# Patient Record
Sex: FEMALE | Race: WHITE | Employment: OTHER | URBAN - METROPOLITAN AREA
[De-identification: names, ages, dates, MRNs, and addresses within clinical notes are randomized per-mention and may not be internally consistent; named-entity substitution may affect disease eponyms.]

---

## 2020-06-23 ENCOUNTER — OFFICE VISIT (OUTPATIENT)
Dept: PRIMARY CARE CLINIC | Age: 70
End: 2020-06-23

## 2020-06-23 PROCEDURE — 99213 OFFICE O/P EST LOW 20 MIN: CPT | Performed by: NURSE PRACTITIONER

## 2020-06-23 NOTE — PROGRESS NOTES
order in place, patient verbalized understanding. Preventing the spread of Coronavirus, Possible COVID-19 exposure with self-quarantine, isolation instructions and management of symptoms, and to follow-up with primary care physician or emergency department if symptomatic complaints worsen.

## 2020-06-25 LAB
SARS-COV-2: NOT DETECTED
SOURCE: NORMAL

## 2021-01-12 ENCOUNTER — NURSE TRIAGE (OUTPATIENT)
Dept: OTHER | Facility: CLINIC | Age: 71
End: 2021-01-12

## 2021-01-12 ENCOUNTER — TELEPHONE (OUTPATIENT)
Dept: ADMINISTRATIVE | Age: 71
End: 2021-01-12

## 2021-01-12 NOTE — TELEPHONE ENCOUNTER
Patient called pre-service center [facility]) Jacqueline Inocencio in [place]  with red flag complaint. No PCP at this time. Brief description of triage: The patient is having blood pressure issues. The patient is monitoring her blood pressure at home- most often 170-200/80. While on the phone with this writer the blood pressure was 240/93. Triage indicates for patient to to be seen today. UCC or the ED suggested for immediate care. Reiteration of the recommendation and patient is refusing the ED or the UCC.but then asks where an UCC is near her. [facility] found for her. Care advice provided, patient verbalizes understanding; denies any other questions or concerns; instructed to call back for any new or worsening symptoms. Writer provided warm transfer to [facility] for appointment scheduling. Attention Provider: Thank you for allowing me to participate in the care of your patient. The patient was connected to triage in response to information provided to the ECC. Please do not respond through this encounter as the response is not directed to a shared pool. Reason for Disposition   Systolic BP >= 330 OR Diastolic >= 993    Answer Assessment - Initial Assessment Questions  1. BLOOD PRESSURE: \"What is the blood pressure? \" \"Did you take at least two measurements 5 minutes apart?\"      190/80  Taken while on the phone with this writer= 240/93    2. ONSET: \"When did you take your blood pressure? \"      Taken while on the phone with me    3. HOW: \"How did you obtain the blood pressure? \" (e.g., visiting nurse, automatic home BP monitor)        Automatic home blood pressure cuff on her wrist    4. HISTORY: \"Do you have a history of high blood pressure? \"      Does have a history of HTN but has not been medicated for this for one year- she is interested in taking only one medication for her blood pressure not multiple medications.  Most recently (one year ago) was on Lisinopril. 5. MEDICATIONS: [place] you taking any medications for blood pressure? \" \"Have you missed any doses recently? \"      Has been diagnosed several years ago with HTN. Has not taken blood pressure medication for one year. 6. OTHER SYMPTOMS: \"Do you have any symptoms? \" (e.g., headache, chest pain, blurred vision, difficulty breathing, weakness)      The patient denies a headache, chest pain, blurred vision, SOB, or weakness. She states she is fatigued. 7. PREGNANCY: \"Is there any chance you are pregnant? \" \"When was your last menstrual period? \"      n/a    Protocols used: HIGH BLOOD PRESSURE-ADULT-[place]  see above documentation

## 2021-03-23 ENCOUNTER — OFFICE VISIT (OUTPATIENT)
Dept: PRIMARY CARE CLINIC | Age: 71
End: 2021-03-23
Payer: MEDICARE

## 2021-03-23 VITALS
BODY MASS INDEX: 23.75 KG/M2 | TEMPERATURE: 97.9 F | HEART RATE: 63 BPM | SYSTOLIC BLOOD PRESSURE: 228 MMHG | RESPIRATION RATE: 16 BRPM | DIASTOLIC BLOOD PRESSURE: 86 MMHG | HEIGHT: 60 IN | WEIGHT: 121 LBS

## 2021-03-23 DIAGNOSIS — R01.1 SYSTOLIC MURMUR: ICD-10-CM

## 2021-03-23 DIAGNOSIS — R73.03 PREDIABETES: ICD-10-CM

## 2021-03-23 DIAGNOSIS — Z86.73 H/O TIA (TRANSIENT ISCHEMIC ATTACK) AND STROKE: ICD-10-CM

## 2021-03-23 DIAGNOSIS — I10 ESSENTIAL HYPERTENSION: ICD-10-CM

## 2021-03-23 DIAGNOSIS — Z76.89 ENCOUNTER TO ESTABLISH CARE: Primary | ICD-10-CM

## 2021-03-23 DIAGNOSIS — E78.2 MIXED HYPERLIPIDEMIA: ICD-10-CM

## 2021-03-23 DIAGNOSIS — Z12.11 COLON CANCER SCREENING: ICD-10-CM

## 2021-03-23 DIAGNOSIS — Z91.81 AT HIGH RISK FOR FALLS: ICD-10-CM

## 2021-03-23 PROBLEM — M15.9 GENERALIZED OSTEOARTHROSIS, UNSPECIFIED SITE: Status: ACTIVE | Noted: 2021-03-23

## 2021-03-23 PROBLEM — M81.0 OSTEOPOROSIS: Status: ACTIVE | Noted: 2017-03-09

## 2021-03-23 PROBLEM — L82.1 SEBORRHEIC KERATOSES: Status: ACTIVE | Noted: 2017-03-07

## 2021-03-23 PROBLEM — E78.5 DYSLIPIDEMIA: Status: ACTIVE | Noted: 2019-04-03

## 2021-03-23 PROBLEM — R41.3 MEMORY IMPAIRMENT: Status: ACTIVE | Noted: 2017-03-07

## 2021-03-23 LAB
A/G RATIO: 1.2 (ref 1.1–2.2)
ALBUMIN SERPL-MCNC: 4.3 G/DL (ref 3.4–5)
ALP BLD-CCNC: 120 U/L (ref 40–129)
ALT SERPL-CCNC: 12 U/L (ref 10–40)
ANION GAP SERPL CALCULATED.3IONS-SCNC: 9 MMOL/L (ref 3–16)
AST SERPL-CCNC: 19 U/L (ref 15–37)
BASOPHILS ABSOLUTE: 0 K/UL (ref 0–0.2)
BASOPHILS RELATIVE PERCENT: 0.5 %
BILIRUB SERPL-MCNC: 0.4 MG/DL (ref 0–1)
BUN BLDV-MCNC: 14 MG/DL (ref 7–20)
CALCIUM SERPL-MCNC: 9.3 MG/DL (ref 8.3–10.6)
CHLORIDE BLD-SCNC: 103 MMOL/L (ref 99–110)
CHOLESTEROL, TOTAL: 218 MG/DL (ref 0–199)
CO2: 28 MMOL/L (ref 21–32)
CREAT SERPL-MCNC: 0.7 MG/DL (ref 0.6–1.2)
EOSINOPHILS ABSOLUTE: 0.1 K/UL (ref 0–0.6)
EOSINOPHILS RELATIVE PERCENT: 2 %
GFR AFRICAN AMERICAN: >60
GFR NON-AFRICAN AMERICAN: >60
GLOBULIN: 3.5 G/DL
GLUCOSE BLD-MCNC: 114 MG/DL (ref 70–99)
HCT VFR BLD CALC: 43 % (ref 36–48)
HDLC SERPL-MCNC: 51 MG/DL (ref 40–60)
HEMOGLOBIN: 14.5 G/DL (ref 12–16)
LDL CHOLESTEROL CALCULATED: 151 MG/DL
LYMPHOCYTES ABSOLUTE: 2.1 K/UL (ref 1–5.1)
LYMPHOCYTES RELATIVE PERCENT: 31.7 %
MCH RBC QN AUTO: 30.4 PG (ref 26–34)
MCHC RBC AUTO-ENTMCNC: 33.7 G/DL (ref 31–36)
MCV RBC AUTO: 90.3 FL (ref 80–100)
MONOCYTES ABSOLUTE: 0.6 K/UL (ref 0–1.3)
MONOCYTES RELATIVE PERCENT: 9.1 %
NEUTROPHILS ABSOLUTE: 3.8 K/UL (ref 1.7–7.7)
NEUTROPHILS RELATIVE PERCENT: 56.7 %
PDW BLD-RTO: 12.6 % (ref 12.4–15.4)
PLATELET # BLD: 274 K/UL (ref 135–450)
PMV BLD AUTO: 10.6 FL (ref 5–10.5)
POTASSIUM SERPL-SCNC: 4.2 MMOL/L (ref 3.5–5.1)
RBC # BLD: 4.76 M/UL (ref 4–5.2)
SODIUM BLD-SCNC: 140 MMOL/L (ref 136–145)
TOTAL PROTEIN: 7.8 G/DL (ref 6.4–8.2)
TRIGL SERPL-MCNC: 78 MG/DL (ref 0–150)
VLDLC SERPL CALC-MCNC: 16 MG/DL
WBC # BLD: 6.7 K/UL (ref 4–11)

## 2021-03-23 PROCEDURE — 99204 OFFICE O/P NEW MOD 45 MIN: CPT | Performed by: FAMILY MEDICINE

## 2021-03-23 RX ORDER — LISINOPRIL 40 MG/1
40 TABLET ORAL EVERY EVENING
COMMUNITY
Start: 2020-01-29 | End: 2021-03-30

## 2021-03-23 RX ORDER — LOSARTAN POTASSIUM 25 MG/1
25 TABLET ORAL DAILY
Qty: 30 TABLET | Refills: 1 | Status: SHIPPED | OUTPATIENT
Start: 2021-03-23 | End: 2021-03-30

## 2021-03-23 RX ORDER — LOSARTAN POTASSIUM 25 MG/1
25 TABLET ORAL DAILY
Qty: 30 TABLET | Refills: 5 | Status: SHIPPED | OUTPATIENT
Start: 2021-03-23 | End: 2021-03-23

## 2021-03-23 RX ORDER — HYDROCHLOROTHIAZIDE 12.5 MG/1
CAPSULE, GELATIN COATED ORAL
COMMUNITY
Start: 2021-01-12 | End: 2021-03-30

## 2021-03-23 RX ORDER — FLUTICASONE PROPIONATE 50 MCG
1 SPRAY, SUSPENSION (ML) NASAL DAILY
COMMUNITY

## 2021-03-23 SDOH — ECONOMIC STABILITY: TRANSPORTATION INSECURITY
IN THE PAST 12 MONTHS, HAS THE LACK OF TRANSPORTATION KEPT YOU FROM MEDICAL APPOINTMENTS OR FROM GETTING MEDICATIONS?: NO

## 2021-03-23 SDOH — ECONOMIC STABILITY: FOOD INSECURITY: WITHIN THE PAST 12 MONTHS, THE FOOD YOU BOUGHT JUST DIDN'T LAST AND YOU DIDN'T HAVE MONEY TO GET MORE.: NEVER TRUE

## 2021-03-23 SDOH — ECONOMIC STABILITY: TRANSPORTATION INSECURITY
IN THE PAST 12 MONTHS, HAS LACK OF TRANSPORTATION KEPT YOU FROM MEETINGS, WORK, OR FROM GETTING THINGS NEEDED FOR DAILY LIVING?: NO

## 2021-03-23 ASSESSMENT — ENCOUNTER SYMPTOMS
SHORTNESS OF BREATH: 0
CHEST TIGHTNESS: 0
VOMITING: 0
BLOOD IN STOOL: 0
DIARRHEA: 0
SINUS PAIN: 0
SORE THROAT: 0
COUGH: 0
ABDOMINAL PAIN: 0
BACK PAIN: 0
NAUSEA: 0
RHINORRHEA: 0
SINUS PRESSURE: 0
CONSTIPATION: 0
WHEEZING: 0

## 2021-03-23 ASSESSMENT — PATIENT HEALTH QUESTIONNAIRE - PHQ9
DEPRESSION UNABLE TO ASSESS: FUNCTIONAL CAPACITY MOTIVATION LIMITS ACCURACY
SUM OF ALL RESPONSES TO PHQ QUESTIONS 1-9: 0
SUM OF ALL RESPONSES TO PHQ QUESTIONS 1-9: 0
1. LITTLE INTEREST OR PLEASURE IN DOING THINGS: 0

## 2021-03-23 NOTE — PROGRESS NOTES
Chief Complaint   Patient presents with    Establish Care    Hypertension         HPI:  Lisa Alonso is a 66 y.o. (: 1950) here today for blood pressure concerns and to establish care. HTN - H/o TIA  Rx: HCTZ 12.5 mg daily. Lisinopril 40 mg daily  Compliant: No  Does yes check BP at home. Brought logs:   No results found for: CREATININE     HLD  Rx: none  The ASCVD Risk score (Mimi Anderson, et al., 2013) failed to calculate for the following reasons: The valid systolic blood pressure range is 90 to 200 mmHg    Cannot find a previous HDL lab    Cannot find a previous total cholesterol lab    Interested in updating cscope. Elevated glucose in the past.   Is trying to eat more veggies and less animal/dairy products. Review of Systems   Constitutional: Negative for activity change, appetite change, chills, fatigue, fever and unexpected weight change. HENT: Negative for congestion, postnasal drip, rhinorrhea, sinus pressure, sinus pain, sneezing and sore throat. Eyes: Negative for visual disturbance. Respiratory: Negative for cough, chest tightness, shortness of breath and wheezing. Cardiovascular: Negative for chest pain and palpitations. Gastrointestinal: Negative for abdominal pain, blood in stool, constipation, diarrhea, nausea and vomiting. Endocrine: Negative for cold intolerance, heat intolerance, polydipsia and polyuria. Genitourinary: Negative for dysuria, frequency, vaginal bleeding and vaginal discharge. Musculoskeletal: Negative for arthralgias, back pain, joint swelling, myalgias and neck pain. Skin: Negative for rash and wound. Allergic/Immunologic: Negative for environmental allergies. Neurological: Negative for dizziness, tremors, syncope, weakness, light-headedness, numbness and headaches. Hematological: Negative for adenopathy. Psychiatric/Behavioral: Negative for behavioral problems, decreased concentration, sleep disturbance and suicidal ideas.  The patient is not nervous/anxious. Past Medical History:   Diagnosis Date    Skin cancer 2018    s/p excision       Family History   Problem Relation Age of Onset    Heart Disease Mother     Stroke Mother     Heart Disease Father        Social History     Tobacco Use    Smoking status: Never Smoker    Smokeless tobacco: Never Used   Substance Use Topics    Alcohol use: Never     Frequency: Never     Binge frequency: Never    Drug use: Never       New Prescriptions    LOSARTAN (COZAAR) 25 MG TABLET    Take 1 tablet by mouth daily       Meds Prior to visit:  Current Outpatient Medications on File Prior to Visit   Medication Sig Dispense Refill    fluticasone (FLONASE) 50 MCG/ACT nasal spray 1 spray by Each Nostril route daily      hydroCHLOROthiazide (MICROZIDE) 12.5 MG capsule TAKE 1 CAPSULE BY MOUTH EVERY DAY      lisinopril (PRINIVIL;ZESTRIL) 40 MG tablet Take 40 mg by mouth every evening       No current facility-administered medications on file prior to visit. Allergies   Allergen Reactions    Amoxicillin     Norvasc [Amlodipine]        OBJECTIVE:  BP (!) 228/86   Pulse 63   Temp 97.9 °F (36.6 °C) (Temporal)   Resp 16   Ht 4' 11.5\" (1.511 m)   Wt 121 lb (54.9 kg)   Breastfeeding No   BMI 24.03 kg/m²   BP Readings from Last 2 Encounters:   03/23/21 (!) 228/86     Wt Readings from Last 3 Encounters:   03/23/21 121 lb (54.9 kg)       Physical Exam  Vitals signs reviewed. Constitutional:       General: She is not in acute distress. Appearance: She is well-developed. HENT:      Head: Normocephalic and atraumatic. Right Ear: Tympanic membrane, ear canal and external ear normal. There is no impacted cerumen. Left Ear: Tympanic membrane, ear canal and external ear normal. There is no impacted cerumen. Mouth/Throat:      Mouth: Mucous membranes are moist.      Pharynx: Oropharynx is clear. No oropharyngeal exudate or posterior oropharyngeal erythema.    Eyes: General: No scleral icterus. Right eye: No discharge. Left eye: No discharge. Conjunctiva/sclera: Conjunctivae normal.      Pupils: Pupils are equal, round, and reactive to light. Neck:      Musculoskeletal: Normal range of motion and neck supple. Cardiovascular:      Rate and Rhythm: Normal rate and regular rhythm. Pulses: Normal pulses. Heart sounds: Murmur present. Systolic murmur present with a grade of 3/6. Comments: Radial and pedal pulses intact  Pulmonary:      Effort: Pulmonary effort is normal. No respiratory distress. Breath sounds: Normal breath sounds. No wheezing or rales. Chest:      Chest wall: No tenderness. Abdominal:      General: Bowel sounds are normal.      Palpations: Abdomen is soft. Tenderness: There is no abdominal tenderness. There is no guarding. Comments: Normal liver and spleen. No organomegaly   Musculoskeletal: Normal range of motion. General: No tenderness. Comments: Intact in all extremities   Lymphadenopathy:      Cervical: No cervical adenopathy. Skin:     General: Skin is warm. Findings: No erythema or rash. Neurological:      General: No focal deficit present. Mental Status: She is alert and oriented to person, place, and time. Mental status is at baseline. Motor: No weakness or abnormal muscle tone. Coordination: Coordination normal.      Gait: Gait normal.      Deep Tendon Reflexes: Reflexes normal.   Psychiatric:         Mood and Affect: Mood normal.         Behavior: Behavior normal.         Thought Content: Thought content normal.         Judgment: Judgment normal.           ASSESSMENT/PLAN:  1. Encounter to establish care  VS reviewed   BMI reviewed   All questions answered. F/u discussed. Healthy lifestyle modifications discussed. 2. Essential hypertension  Is not taking her HCTZ or lisinopril. States that lisinopril is not working for her.  Has not been taking meds for months. Did not like her cardiologist.   Used to be on nifedipine as well. Made it clear she does not like medications  Update labs and start losartan. Follow up in one week. - Lipid Panel; Future  - Comprehensive Metabolic Panel; Future  - CBC Auto Differential; Future  - losartan (COZAAR) 25 MG tablet; Take 1 tablet by mouth daily  Dispense: 30 tablet; Refill: 1    3. Systolic murmur  Denies dizziness, fatigue, lightheadedness, syncope and SOB. Recommended Echo and she prefers not to have this done at this time. 4. Prediabetes  Update A1c.   - Lipid Panel; Future  - CBC Auto Differential; Future  - HEMOGLOBIN A1C; Future    5. Mixed hyperlipidemia  Update lipid panel. Taking fish oil  - Lipid Panel; Future    6. H/O TIA (transient ischemic attack) and stroke  Stable. AOx3    7. At high risk for falls  On the basis of positive falls risk screening, assessment and plan is as follows: home safety tips provided, patient will follow up in 1 week(s) for further evaluation. 8. Colon cancer screening  Ordered. - Cologuard (For External Results Only); Future    Discussed use, benefit, and side effects of prescribed medications. Barriers to medication compliance addressed. All patient questions answered. Pt voiced understanding. RTC Return in about 1 week (around 3/30/2021), or if symptoms worsen or fail to improve.     Future Appointments   Date Time Provider Sonja Quiroga   3/30/2021  2:00 PM MD [facility] ELVIRA Caballero MD  3/23/2021  10:33 AM

## 2021-03-24 LAB
ESTIMATED AVERAGE GLUCOSE: 139.9 MG/DL
HBA1C MFR BLD: 6.5 %

## 2021-03-29 NOTE — PROGRESS NOTES
Negative for dizziness, tremors, syncope, weakness, light-headedness, numbness and headaches. Hematological: Negative for adenopathy. Psychiatric/Behavioral: Negative for behavioral problems, decreased concentration, sleep disturbance and suicidal ideas. The patient is not nervous/anxious. Past Medical History:   Diagnosis Date    Skin cancer 2018    s/p excision       Family History   Problem Relation Age of Onset    Heart Disease Mother     Stroke Mother     Heart Disease Father        Social History     Tobacco Use    Smoking status: Never Smoker    Smokeless tobacco: Never Used   Substance Use Topics    Alcohol use: Never     Frequency: Never     Binge frequency: Never    Drug use: Never       New Prescriptions    No medications on file       Meds Prior to visit:  Current Outpatient Medications on File Prior to Visit   Medication Sig Dispense Refill    fluticasone (FLONASE) 50 MCG/ACT nasal spray 1 spray by Each Nostril route daily       No current facility-administered medications on file prior to visit. Allergies   Allergen Reactions    Amoxicillin     Norvasc [Amlodipine]        OBJECTIVE:  BP (!) 205/82   Pulse 60   Temp 97.5 °F (36.4 °C) (Temporal)   Resp 16   Wt 124 lb 3.2 oz (56.3 kg)   Breastfeeding No   BMI 24.67 kg/m²   BP Readings from Last 2 Encounters:   03/30/21 (!) 205/82   03/23/21 (!) 228/86     Wt Readings from Last 3 Encounters:   03/30/21 124 lb 3.2 oz (56.3 kg)   03/23/21 121 lb (54.9 kg)       Physical Exam  Vitals signs reviewed. Constitutional:       General: She is not in acute distress. Appearance: She is well-developed. HENT:      Head: Normocephalic and atraumatic. Right Ear: Tympanic membrane, ear canal and external ear normal. There is no impacted cerumen. Left Ear: Tympanic membrane, ear canal and external ear normal. There is no impacted cerumen.       Mouth/Throat:      Mouth: Mucous membranes are moist.      Pharynx: Oropharynx is clear. No oropharyngeal exudate or posterior oropharyngeal erythema. Eyes:      General: No scleral icterus. Right eye: No discharge. Left eye: No discharge. Conjunctiva/sclera: Conjunctivae normal.      Pupils: Pupils are equal, round, and reactive to light. Neck:      Musculoskeletal: Normal range of motion and neck supple. Cardiovascular:      Rate and Rhythm: Normal rate and regular rhythm. Heart sounds: Normal heart sounds. No murmur. Comments: Radial and pedal pulses intact  Pulmonary:      Effort: Pulmonary effort is normal. No respiratory distress. Breath sounds: Normal breath sounds. No wheezing or rales. Chest:      Chest wall: No tenderness. Abdominal:      General: Bowel sounds are normal.      Palpations: Abdomen is soft. Tenderness: There is no abdominal tenderness. There is no guarding. Comments: Normal liver and spleen. No organomegaly   Musculoskeletal: Normal range of motion. General: No tenderness. Comments: Intact in all extremities   Lymphadenopathy:      Cervical: No cervical adenopathy. Skin:     General: Skin is warm. Findings: No erythema or rash. Neurological:      General: No focal deficit present. Mental Status: She is alert and oriented to person, place, and time. Mental status is at baseline. Motor: No weakness or abnormal muscle tone. Coordination: Coordination normal.      Gait: Gait normal.      Deep Tendon Reflexes: Reflexes normal.   Psychiatric:         Mood and Affect: Mood normal.         Behavior: Behavior normal.         Thought Content:  Thought content normal.         Judgment: Judgment normal.         Lab Results   Component Value Date    WBC 6.7 03/23/2021    HGB 14.5 03/23/2021    HCT 43.0 03/23/2021    MCV 90.3 03/23/2021     03/23/2021     Lab Results   Component Value Date     03/23/2021    K 4.2 03/23/2021     03/23/2021    CO2 28 03/23/2021    BUN 14 03/23/2021    CREATININE 0.7 03/23/2021    GLUCOSE 114 (H) 03/23/2021    CALCIUM 9.3 03/23/2021    PROT 7.8 03/23/2021    LABALBU 4.3 03/23/2021    BILITOT 0.4 03/23/2021    ALKPHOS 120 03/23/2021    AST 19 03/23/2021    ALT 12 03/23/2021    LABGLOM >60 03/23/2021    GFRAA >60 03/23/2021    AGRATIO 1.2 03/23/2021    GLOB 3.5 03/23/2021     Lab Results   Component Value Date    CHOL 218 (H) 03/23/2021     Lab Results   Component Value Date    TRIG 78 03/23/2021     Lab Results   Component Value Date    HDL 51 03/23/2021     Lab Results   Component Value Date    LDLCALC 151 (H) 03/23/2021     Lab Results   Component Value Date    LABVLDL 16 03/23/2021     Lab Results   Component Value Date    LABA1C 6.5 03/23/2021         ASSESSMENT/PLAN:  1. Essential hypertension  Increased Cozaar to 50 mg daily. Follow-up in 1 week via MyChart or virtually. We will titrate medication accordingly  - losartan (COZAAR) 50 MG tablet; Take 1 tablet by mouth daily  Dispense: 60 tablet; Refill: 0    2. Mixed hyperlipidemia  Continue lifestyle modifications. She is incorporating swimming starting this week and increasing your weekly step count. She will increase vegetables and whole grains and decrease animal proteins. 3. Prediabetes  As above. Discussed pathophysiology of diabetes and sugar control in depth. We will work on lifestyle modifications as above. Follow-up in 3 months to update A1c. Discussed use, benefit, and side effects of prescribed medications. Barriers to medication compliance addressed. All patient questions answered. Pt voiced understanding. RTC Return in about 3 months (around 6/30/2021), or if symptoms worsen or fail to improve.     Future Appointments   Date Time Provider Sonja Kimber   7/6/2021  9:00 AM MD [facility] Jessica Hernandez MD  3/30/2021  3:33 PM

## 2021-03-30 ENCOUNTER — OFFICE VISIT (OUTPATIENT)
Dept: PRIMARY CARE CLINIC | Age: 71
End: 2021-03-30
Payer: MEDICARE

## 2021-03-30 VITALS
BODY MASS INDEX: 24.67 KG/M2 | WEIGHT: 124.2 LBS | SYSTOLIC BLOOD PRESSURE: 205 MMHG | DIASTOLIC BLOOD PRESSURE: 82 MMHG | RESPIRATION RATE: 16 BRPM | HEART RATE: 60 BPM | TEMPERATURE: 97.5 F

## 2021-03-30 DIAGNOSIS — E78.2 MIXED HYPERLIPIDEMIA: ICD-10-CM

## 2021-03-30 DIAGNOSIS — I10 ESSENTIAL HYPERTENSION: Primary | ICD-10-CM

## 2021-03-30 DIAGNOSIS — R73.03 PREDIABETES: ICD-10-CM

## 2021-03-30 PROCEDURE — 99214 OFFICE O/P EST MOD 30 MIN: CPT | Performed by: FAMILY MEDICINE

## 2021-03-30 RX ORDER — LOSARTAN POTASSIUM 50 MG/1
50 TABLET ORAL DAILY
Qty: 60 TABLET | Refills: 0 | Status: SHIPPED | OUTPATIENT
Start: 2021-03-30 | End: 2021-04-02

## 2021-03-30 ASSESSMENT — ENCOUNTER SYMPTOMS
SHORTNESS OF BREATH: 0
SINUS PRESSURE: 0
SORE THROAT: 0
COUGH: 0
CHEST TIGHTNESS: 0
VOMITING: 0
WHEEZING: 0
SINUS PAIN: 0
BACK PAIN: 0
DIARRHEA: 0
NAUSEA: 0
ABDOMINAL PAIN: 0
RHINORRHEA: 0
BLOOD IN STOOL: 0
CONSTIPATION: 0

## 2021-04-02 ENCOUNTER — PATIENT MESSAGE (OUTPATIENT)
Dept: PRIMARY CARE CLINIC | Age: 71
End: 2021-04-02

## 2021-04-02 DIAGNOSIS — I10 ESSENTIAL HYPERTENSION: ICD-10-CM

## 2021-04-02 RX ORDER — LOSARTAN POTASSIUM 100 MG/1
100 TABLET ORAL DAILY
Qty: 30 TABLET | Refills: 0 | Status: SHIPPED | OUTPATIENT
Start: 2021-04-02 | End: 2021-04-08 | Stop reason: ALTCHOICE

## 2021-04-02 NOTE — TELEPHONE ENCOUNTER
From: Felipa Le  To: Anthony Morrow MD  Sent: 4/2/2021 6:37 AM EDT  Subject: Prescription Question    Please let Dr. Elvira Grullon know that taking Losartan 50mg has not made a difference in my blood pressure readings. In the morning, the upper number is still around 200 - 210. This only goes down to the upper 170's later in the day. The bottom number is still in the 80 range. Could we try another medication?

## 2021-04-08 ENCOUNTER — PATIENT MESSAGE (OUTPATIENT)
Dept: PRIMARY CARE CLINIC | Age: 71
End: 2021-04-08

## 2021-04-08 DIAGNOSIS — I10 ESSENTIAL HYPERTENSION: Primary | ICD-10-CM

## 2021-04-08 RX ORDER — LOSARTAN POTASSIUM AND HYDROCHLOROTHIAZIDE 25; 100 MG/1; MG/1
1 TABLET ORAL DAILY
Qty: 90 TABLET | Refills: 1 | Status: SHIPPED | OUTPATIENT
Start: 2021-04-08 | End: 2021-04-28

## 2021-04-08 NOTE — TELEPHONE ENCOUNTER
From: Justino Le  To: Terrence Harmon MD  Sent: 4/8/2021 6:36 AM EDT  Subject: Prescription Question    Dr. Olga Woodard,    My blood pressure is still not improving significantly. The bottom number remains good but the top number is still high. Usually 190 in the am and 140-170 in the pm. I have been taking the Losartan 100mg for about a week now. In the future, would you please call my prescriptions in to the [facility] in [place] [address]. [address] [place] [address] not [place]:    [address] [facility]  [phone]    Thank you,  Justino Le

## 2021-04-16 DIAGNOSIS — I10 ESSENTIAL HYPERTENSION: ICD-10-CM

## 2021-04-22 DIAGNOSIS — R92.8 ABNORMALITY OF LEFT BREAST ON SCREENING MAMMOGRAM: Primary | ICD-10-CM

## 2021-04-24 DIAGNOSIS — R92.8 ABNORMALITY OF LEFT BREAST ON SCREENING MAMMOGRAM: Primary | ICD-10-CM

## 2021-04-24 NOTE — PROGRESS NOTES
Please print order for stereotactic biopsy and fax to [phone]. Any questions, please call [phone].     Thank you,  Dr. Ana Maria Perez

## 2021-04-28 DIAGNOSIS — I10 ESSENTIAL HYPERTENSION: Primary | ICD-10-CM

## 2021-04-28 RX ORDER — HYDROCHLOROTHIAZIDE 25 MG/1
50 TABLET ORAL EVERY MORNING
Qty: 90 TABLET | Refills: 3 | Status: SHIPPED | OUTPATIENT
Start: 2021-04-28 | End: 2021-08-30 | Stop reason: ALTCHOICE

## 2021-04-28 RX ORDER — LOSARTAN POTASSIUM 100 MG/1
100 TABLET ORAL DAILY
Qty: 90 TABLET | Refills: 1 | Status: SHIPPED | OUTPATIENT
Start: 2021-04-28 | End: 2022-01-04

## 2021-05-05 ENCOUNTER — TELEPHONE (OUTPATIENT)
Dept: PRIMARY CARE CLINIC | Age: 71
End: 2021-05-05

## 2021-05-05 NOTE — TELEPHONE ENCOUNTER
Avis Carrillo from [facility] called- She needs an order for a stereo tactic bio psi of left breast faxed to [phone] .

## 2021-05-06 NOTE — TELEPHONE ENCOUNTER
I faxed the order to Char Monroy at requested phone number. I found the order in patient's chart. However, I did not know what all the abbreviations meant. Please write it out for me. Thank you.

## 2021-06-17 NOTE — PROGRESS NOTES
MG tablet Take 1 tablet by mouth daily 90 tablet 1    hydroCHLOROthiazide (HYDRODIURIL) 25 MG tablet Take 2 tablets by mouth every morning 90 tablet 3    fluticasone (FLONASE) 50 MCG/ACT nasal spray 1 spray by Each Nostril route daily       No current facility-administered medications on file prior to visit. Allergies   Allergen Reactions    Amoxicillin     Norvasc [Amlodipine]        OBJECTIVE:  BP (!) 187/79   Pulse 59   Temp 97.9 °F (36.6 °C) (Temporal)   Resp 18   Ht 4' 11.5\" (1.511 m)   Wt 120 lb 12.8 oz (54.8 kg)   BMI 23.99 kg/m²   BP Readings from Last 2 Encounters:   06/18/21 (!) 187/79   03/30/21 (!) 205/82     Wt Readings from Last 3 Encounters:   06/18/21 120 lb 12.8 oz (54.8 kg)   03/30/21 124 lb 3.2 oz (56.3 kg)   03/23/21 121 lb (54.9 kg)       Physical Exam  Vitals reviewed. Constitutional:       General: She is not in acute distress. Appearance: Normal appearance. She is not ill-appearing. HENT:      Head: Normocephalic and atraumatic. Right Ear: External ear normal.      Left Ear: External ear normal.      Nose: Nose normal. No congestion. Mouth/Throat:      Mouth: Mucous membranes are moist.      Pharynx: Oropharynx is clear. No oropharyngeal exudate. Eyes:      Extraocular Movements: Extraocular movements intact. Conjunctiva/sclera: Conjunctivae normal.      Pupils: Pupils are equal, round, and reactive to light. Cardiovascular:      Rate and Rhythm: Normal rate and regular rhythm. Pulses: Normal pulses. Heart sounds: S1 normal and S2 normal. Murmur heard. Systolic murmur is present with a grade of 2/6. Pulmonary:      Effort: Pulmonary effort is normal. No respiratory distress. Breath sounds: Normal breath sounds. No stridor. No wheezing, rhonchi or rales. Abdominal:      General: Bowel sounds are normal.      Palpations: Abdomen is soft. Tenderness: There is no abdominal tenderness.    Musculoskeletal:         General: Normal range of motion. Cervical back: Normal range of motion and neck supple. Right lower leg: No edema. Left lower leg: No edema. Skin:     General: Skin is warm. Capillary Refill: Capillary refill takes less than 2 seconds. Findings: No erythema or rash. Neurological:      General: No focal deficit present. Mental Status: She is alert and oriented to person, place, and time. Mental status is at baseline. Motor: No weakness. Coordination: Coordination normal.      Gait: Gait normal.   Psychiatric:         Mood and Affect: Mood normal.         Behavior: Behavior normal.         Thought Content: Thought content normal.         Judgment: Judgment normal.         Lab Results   Component Value Date    WBC 6.7 03/23/2021    HGB 14.5 03/23/2021    HCT 43.0 03/23/2021    MCV 90.3 03/23/2021     03/23/2021     Lab Results   Component Value Date     03/23/2021    K 4.2 03/23/2021     03/23/2021    CO2 28 03/23/2021    BUN 14 03/23/2021    CREATININE 0.7 03/23/2021    GLUCOSE 114 (H) 03/23/2021    CALCIUM 9.3 03/23/2021    PROT 7.8 03/23/2021    LABALBU 4.3 03/23/2021    BILITOT 0.4 03/23/2021    ALKPHOS 120 03/23/2021    AST 19 03/23/2021    ALT 12 03/23/2021    LABGLOM >60 03/23/2021    GFRAA >60 03/23/2021    AGRATIO 1.2 03/23/2021    GLOB 3.5 03/23/2021     Lab Results   Component Value Date    CHOL 218 (H) 03/23/2021     Lab Results   Component Value Date    TRIG 78 03/23/2021     Lab Results   Component Value Date    HDL 51 03/23/2021     Lab Results   Component Value Date    LDLCALC 151 (H) 03/23/2021     Lab Results   Component Value Date    LABVLDL 16 03/23/2021     Lab Results   Component Value Date    LABA1C 6.5 03/23/2021         ASSESSMENT/PLAN:  1. Routine general medical examination at a health care facility  Refer to AWV note    2.  Essential hypertension  We will continue HCTZ 50 mg daily, losartan 100 mg daily  Hesitant to add beta-blocker given heart rate and age. Has tried lisinopril and Norvasc in the past with bad reactions. Discussed starting nifedipine with patient in the midst of bad reaction to Norvasc. She is open to trying it. Also discussed echo and updating labs as well as renal ultrasound. Gave information for nephrology referral in [place] if nifedipine does not help reduce blood pressure. - [facility] - Brea Cristina MD, Nephrology, [facility]  - Comprehensive Metabolic Panel; Future  - Renin Activity and Aldosterone; Future  - ECHO Complete 2D W Doppler W Color  - US RETROPERITONEAL COMPLETE; Future  - NIFEdipine (PROCARDIA) 20 MG capsule; Take 1 capsule by mouth 2 times daily  Dispense: 90 capsule; Refill: 3  - HEMOGLOBIN A1C; Future    3. Systolic murmur  Complete echo given systolic murmur. Hard to discern if it is a 2 out of 6 or 3 out of 6. Update in the midst of uncontrolled blood pressure  - ECHO Complete 2D W Doppler W Color    4. Resistant hypertension  Add third agents in the midst of bad reactions other medications this is difficult to treat. Rule out renal artery stenosis and aldosterone abnormality. Update renal function. Place nephrology referral   - Comprehensive Metabolic Panel; Future  - Renin Activity and Aldosterone; Future  - US RETROPERITONEAL COMPLETE; Future  - Microalbumin / Creatinine Urine Ratio; Future  - NIFEdipine (PROCARDIA) 20 MG capsule; Take 1 capsule by mouth 2 times daily  Dispense: 90 capsule; Refill: 3    5. Prediabetes  Update A1c and treat accordingly  - HEMOGLOBIN A1C; Future        Discussed use, benefit, and side effects of prescribed medications. Barriers to medication compliance addressed. All patient questions answered. Pt voiced understanding. RTC Return for Medicare Annual Wellness Visit in 1 year. No future appointments.     Maeve Cai MD  6/18/2021  11:44 AM

## 2021-06-18 ENCOUNTER — OFFICE VISIT (OUTPATIENT)
Dept: PRIMARY CARE CLINIC | Age: 71
End: 2021-06-18
Payer: MEDICARE

## 2021-06-18 VITALS
WEIGHT: 120.8 LBS | RESPIRATION RATE: 18 BRPM | HEIGHT: 60 IN | SYSTOLIC BLOOD PRESSURE: 187 MMHG | TEMPERATURE: 97.9 F | HEART RATE: 59 BPM | BODY MASS INDEX: 23.71 KG/M2 | DIASTOLIC BLOOD PRESSURE: 79 MMHG

## 2021-06-18 DIAGNOSIS — I10 ESSENTIAL HYPERTENSION: Primary | ICD-10-CM

## 2021-06-18 DIAGNOSIS — R01.1 SYSTOLIC MURMUR: ICD-10-CM

## 2021-06-18 DIAGNOSIS — I1A.0 RESISTANT HYPERTENSION: ICD-10-CM

## 2021-06-18 DIAGNOSIS — I10 RESISTANT HYPERTENSION: ICD-10-CM

## 2021-06-18 DIAGNOSIS — R73.03 PREDIABETES: ICD-10-CM

## 2021-06-18 DIAGNOSIS — I10 ESSENTIAL HYPERTENSION: ICD-10-CM

## 2021-06-18 DIAGNOSIS — Z00.00 ROUTINE GENERAL MEDICAL EXAMINATION AT A HEALTH CARE FACILITY: ICD-10-CM

## 2021-06-18 LAB
A/G RATIO: 1.5 (ref 1.1–2.2)
ALBUMIN SERPL-MCNC: 4.6 G/DL (ref 3.4–5)
ALP BLD-CCNC: 102 U/L (ref 40–129)
ALT SERPL-CCNC: 13 U/L (ref 10–40)
ANION GAP SERPL CALCULATED.3IONS-SCNC: 9 MMOL/L (ref 3–16)
AST SERPL-CCNC: 21 U/L (ref 15–37)
BILIRUB SERPL-MCNC: 0.4 MG/DL (ref 0–1)
BUN BLDV-MCNC: 18 MG/DL (ref 7–20)
CALCIUM SERPL-MCNC: 9.7 MG/DL (ref 8.3–10.6)
CHLORIDE BLD-SCNC: 99 MMOL/L (ref 99–110)
CO2: 28 MMOL/L (ref 21–32)
CREAT SERPL-MCNC: 0.8 MG/DL (ref 0.6–1.2)
CREATININE URINE: 99.4 MG/DL (ref 28–259)
GFR AFRICAN AMERICAN: >60
GFR NON-AFRICAN AMERICAN: >60
GLOBULIN: 3 G/DL
GLUCOSE BLD-MCNC: 117 MG/DL (ref 70–99)
MICROALBUMIN UR-MCNC: 4.5 MG/DL
MICROALBUMIN/CREAT UR-RTO: 45.3 MG/G (ref 0–30)
POTASSIUM SERPL-SCNC: 4.7 MMOL/L (ref 3.5–5.1)
SODIUM BLD-SCNC: 136 MMOL/L (ref 136–145)
TOTAL PROTEIN: 7.6 G/DL (ref 6.4–8.2)

## 2021-06-18 PROCEDURE — G0402 INITIAL PREVENTIVE EXAM: HCPCS | Performed by: FAMILY MEDICINE

## 2021-06-18 RX ORDER — METOPROLOL SUCCINATE 25 MG/1
25 TABLET, EXTENDED RELEASE ORAL DAILY
Qty: 90 TABLET | Refills: 1 | Status: SHIPPED | OUTPATIENT
Start: 2021-06-18 | End: 2021-06-18 | Stop reason: SINTOL

## 2021-06-18 RX ORDER — NIFEDIPINE 30 MG/1
30 TABLET, FILM COATED, EXTENDED RELEASE ORAL DAILY
Qty: 60 TABLET | Refills: 0 | Status: SHIPPED | OUTPATIENT
Start: 2021-06-18 | End: 2021-06-18

## 2021-06-18 RX ORDER — NIFEDIPINE 20 MG/1
20 CAPSULE ORAL 2 TIMES DAILY
Qty: 90 CAPSULE | Refills: 3 | Status: SHIPPED | OUTPATIENT
Start: 2021-06-18 | End: 2021-06-18

## 2021-06-18 RX ORDER — NIFEDIPINE 30 MG/1
30 TABLET, FILM COATED, EXTENDED RELEASE ORAL DAILY
Qty: 60 TABLET | Refills: 0 | Status: SHIPPED | OUTPATIENT
Start: 2021-06-18 | End: 2021-07-27 | Stop reason: SINTOL

## 2021-06-18 ASSESSMENT — LIFESTYLE VARIABLES
HOW OFTEN DO YOU HAVE A DRINK CONTAINING ALCOHOL: NEVER
HOW OFTEN DO YOU HAVE A DRINK CONTAINING ALCOHOL: 0
AUDIT-C TOTAL SCORE: INCOMPLETE
AUDIT TOTAL SCORE: INCOMPLETE

## 2021-06-18 ASSESSMENT — ENCOUNTER SYMPTOMS
EYE PAIN: 0
CONSTIPATION: 0
SHORTNESS OF BREATH: 0
ABDOMINAL PAIN: 0
DIARRHEA: 0
COUGH: 0

## 2021-06-18 ASSESSMENT — PATIENT HEALTH QUESTIONNAIRE - PHQ9
SUM OF ALL RESPONSES TO PHQ QUESTIONS 1-9: 0
2. FEELING DOWN, DEPRESSED OR HOPELESS: 0
SUM OF ALL RESPONSES TO PHQ9 QUESTIONS 1 & 2: 0
1. LITTLE INTEREST OR PLEASURE IN DOING THINGS: 0

## 2021-06-18 NOTE — TELEPHONE ENCOUNTER
Taras Connolly from [facility] Called in stating that pt was under the impression that she was doing 30 mg tablets of Nifedipine . 30mg Extended Release is covered by pt insurance and they have 30's the pharm.

## 2021-06-18 NOTE — PATIENT INSTRUCTIONS
Personalized Preventive Plan for Edgardo Salazar - 6/18/2021  Medicare offers a range of preventive health benefits. Some of the tests and screenings are paid in full while other may be subject to a deductible, co-insurance, and/or copay. Some of these benefits include a comprehensive review of your medical history including lifestyle, illnesses that may run in your family, and various assessments and screenings as appropriate. After reviewing your medical record and screening and assessments performed today your provider may have ordered immunizations, labs, imaging, and/or referrals for you. A list of these orders (if applicable) as well as your Preventive Care list are included within your After Visit Summary for your review. Other Preventive Recommendations:    · A preventive eye exam performed by an eye specialist is recommended every 1-2 years to screen for glaucoma; cataracts, macular degeneration, and other eye disorders. · A preventive dental visit is recommended every 6 months. · Try to get at least 150 minutes of exercise per week or 10,000 steps per day on a pedometer . · Order or download the FREE \"Exercise & Physical Activity: Your Everyday Guide\" from [employer]. Call 5-100.104.7957 or search [employer] online. · You need 3304-5410 mg of calcium and 4836-1769 IU of vitamin D per day. It is possible to meet your calcium requirement with diet alone, but a vitamin D supplement is usually necessary to meet this goal.  · When exposed to the sun, use a sunscreen that protects against both UVA and UVB radiation with an SPF of 30 or greater. Reapply every 2 to 3 hours or after sweating, drying off with a towel, or swimming. · Always wear a seat belt when traveling in a car. Always wear a helmet when riding a bicycle or motorcycle.

## 2021-06-18 NOTE — PROGRESS NOTES
Medicare Annual Wellness Visit  Name: Khoi [place] Date: 2021   MRN: <[id number]> Sex: Female   Age: 66 y.o. Ethnicity: Non-/Non    : 1950 Race: Rachael Le is here for Hypertension and Medicare AWV    Screenings for behavioral, psychosocial and functional/safety risks, and cognitive dysfunction are all negative except as indicated below. These results, as well as other patient data from the [address] form, are documented in Flowsheets linked to this Encounter. Allergies   Allergen Reactions    Amoxicillin     Norvasc [Amlodipine]          Prior to Visit Medications    Medication Sig Taking? Authorizing Provider   NIFEdipine (PROCARDIA) 20 MG capsule Take 1 capsule by mouth 2 times daily Yes Ronney Dance, MD   losartan (COZAAR) 100 MG tablet Take 1 tablet by mouth daily Yes Ronney Dance, MD   hydroCHLOROthiazide (HYDRODIURIL) 25 MG tablet Take 2 tablets by mouth every morning Yes Ronney Dance, MD   fluticasone (FLONASE) 50 MCG/ACT nasal spray 1 spray by Each Nostril route daily Yes Historical Provider, MD         Past Medical History:   Diagnosis Date    Skin cancer 2018    s/p excision       No past surgical history on file. Family History   Problem Relation Age of Onset    Heart Disease Mother     Stroke Mother     Heart Disease Father        CareTeam (Including outside providers/suppliers regularly involved in providing care):   Patient Care Team:  Ronney Dance, MD as PCP - General (Family Medicine)  Ronney Dance, MD as PCP - REHABILITATION [facility] Empaneled Provider    Wt Readings from Last 3 Encounters:   21 120 lb 12.8 oz (54.8 kg)   21 124 lb 3.2 oz (56.3 kg)   21 121 lb (54.9 kg)     Vitals:    21 0952   BP: (!) 187/79   Pulse: 59   Resp: 18   Temp: 97.9 °F (36.6 °C)   TempSrc: Temporal   Weight: 120 lb 12.8 oz (54.8 kg)   Height: 4' 11.5\" (1.511 m)     Body mass index is 23.99 kg/m².     Based upon direct observation of the patient, evaluation of cognition reveals recent and remote memory intact. Patient's complete Health Risk Assessment and screening values have been reviewed and are found in Flowsheets. The following problems were reviewed today and where indicated follow up appointments were made and/or referrals ordered. Positive Risk Factor Screenings with Interventions:            General Health and ACP:  General  In general, how would you say your health is?: (!) Poor  In the past 7 days, have you experienced any of the following?  New or Increased Pain, New or Increased Fatigue, Loneliness, Social Isolation, Stress or Anger?: None of These  Do you get the social and emotional support that you need?: Yes  Do you have a Living Will?: Yes  Advance Directives     Power of  Living Will ACP-Advance Directive ACP-Power of     Not on File Not on File Not on File Not on File      General Health Risk Interventions:  · Stress: She is worried about her blood pressure      Safety:  Safety  Do you have working smoke detectors?: Yes  Have all throw rugs been removed or fastened?: Yes  Do you have non-slip mats or surfaces in all bathtubs/showers?: (!) No  Do all of your stairways have a railing or banister?: Yes  Are your doorways, halls and stairs free of clutter?: Yes  Do you always fasten your seatbelt when you are in a car?: Yes  Safety Interventions:  · Home safety tips provided     Personalized Preventive Plan   Current Health Maintenance Status  Immunization History   Administered Date(s) Administered    COVID-19, Pinevio, PF, 30mcg/0.3mL 02/11/2021, 03/13/2021    Influenza Virus Vaccine 01/03/2014, 10/16/2014, 09/11/2017, 09/04/2020    Influenza, High Dose (Fluzone 65 yrs and older) 11/10/2016, 12/09/2018, 09/19/2019    Pneumococcal Conjugate 13-valent (Ctvvpuw64) 02/13/2015    Pneumococcal Polysaccharide ([id number]) 08/08/2014    Tdap (Boostrix, Adacel) 12/01/2010    Zoster Live (Zostavax) 02/07/2014    Zoster Recombinant (Shingrix) 10/20/2019, 12/20/2019        Health Maintenance   Topic Date Due    Hepatitis C screen  Never done    Diabetic foot exam  Never done    Diabetic retinal exam  Never done    Diabetic microalbuminuria test  Never done    DEXA (modify frequency per FRAX score)  Never done    DTaP/Tdap/Td vaccine (2 - Td or Tdap) 12/01/2020    Annual Wellness Visit (AWV)  Never done    A1C test (Diabetic or Prediabetic)  03/23/2022    Lipid screen  03/23/2022    Potassium monitoring  03/23/2022    Creatinine monitoring  03/23/2022    Breast cancer screen  04/20/2023    Colon cancer screen fecal DNA test (Cologuard)  04/08/2024    Flu vaccine  Completed    Shingles Vaccine  Completed    Pneumococcal 65+ years Vaccine  Completed    COVID-19 Vaccine  Completed    Hepatitis A vaccine  Aged Out    Hepatitis B vaccine  Aged Out    Hib vaccine  Aged Out    Meningococcal (ACWY) vaccine  Aged Out     Recommendations for NPC III Due: see orders and patient instructions/AVS.  . Recommended screening schedule for the next 5-10 years is provided to the patient in written form: see Patient Instructions/AVS.    Varinder Finley was seen today for hypertension and medicare awv. Diagnoses and all orders for this visit:    Essential hypertension  -     [facility]) - Virgie Gu MD, NephrologyShaun  -     Comprehensive Metabolic Panel; Future  -     Renin Activity and Aldosterone; Future  -     ECHO Complete 2D W Doppler W Color  -     Discontinue: metoprolol succinate (TOPROL XL) 25 MG extended release tablet; Take 1 tablet by mouth daily  -     US RETROPERITONEAL COMPLETE; Future  -     Discontinue: NIFEdipine (ADALAT CC) 30 MG extended release tablet; Take 1 tablet by mouth daily  -     NIFEdipine (PROCARDIA) 20 MG capsule;  Take 1 capsule by mouth 2 times daily  -     HEMOGLOBIN A1C; Future    Routine general medical examination at a health [facility] facility    Systolic murmur  -     ECHO Complete 2D W Doppler W Color    Resistant hypertension  -     Comprehensive Metabolic Panel; Future  -     Renin Activity and Aldosterone; Future  -     US RETROPERITONEAL COMPLETE; Future  -     Microalbumin / Creatinine Urine Ratio; Future  -     Discontinue: NIFEdipine (ADALAT CC) 30 MG extended release tablet; Take 1 tablet by mouth daily  -     NIFEdipine (PROCARDIA) 20 MG capsule;  Take 1 capsule by mouth 2 times daily    Prediabetes  -     HEMOGLOBIN A1C; Future

## 2021-06-19 LAB
ESTIMATED AVERAGE GLUCOSE: 151.3 MG/DL
HBA1C MFR BLD: 6.9 %

## 2021-06-21 ENCOUNTER — TELEPHONE (OUTPATIENT)
Dept: PRIMARY CARE CLINIC | Age: 71
End: 2021-06-21

## 2021-06-21 NOTE — TELEPHONE ENCOUNTER
Left voicemail and sent MY CHART message advising patient order for renal ultrasound is in her chart.

## 2021-06-21 NOTE — TELEPHONE ENCOUNTER
----- Message from Mariana Contreras sent at 6/18/2021  4:14 PM EDT -----  Subject: Referral Request    QUESTIONS   Reason for referral request? us renal COMPLETE   Has the physician seen you for this condition before? No   Preferred Specialist (if applicable)? Suellen May  Do you already have an appointment scheduled? No  Additional Information for Provider?   ---------------------------------------------------------------------------  --------------  CALL BACK INFO  What is the best way for the office to contact you? OK to leave message on   voicemail  Preferred Call Back Phone Number?  [phone]

## 2021-06-21 NOTE — TELEPHONE ENCOUNTER
NIFEdipine (ADALAT CC) 30 MG extended release tablet [[id number]]   Order Details   Dose: 30 mg Route: Oral Frequency: DAILY   Dispense Quantity: 60 tablet Refills: 0         Sig: Take 1 tablet by mouth daily        Start Date: 06/18/21 End Date: --   Written Date: 06/18/21 Expiration Date: 06/18/22      Diagnosis Association: Essential hypertension (I10);  Resistant hypertension (I10)   Original Order: NIFEdipine (ADALAT CC) 30 MG extended release tablet [[id number]]   Providers  Authorizing Provider: Delia Sanchez MD NPI: [id number]   Ordering User: Gurpreet Garcia [id number] [facility], [address] [place] [address] [phone] [facility] [phone]   [address] [place] [address], [address] [place] [address]   Phone: [phone] Fax: [phone]

## 2021-06-22 NOTE — TELEPHONE ENCOUNTER
Sent patient MY CHART message and left voicemail to make sure she picked up medication and is taking it properly.

## 2021-06-25 LAB
ALDOSTERONE/RENIN ACTIVITY CALCULATION: 7.8 RATIO
ALDOSTERONE: 9.3 NG/DL
RENIN ACTIVITY: 1.2 NG/ML/HR

## 2021-06-28 ENCOUNTER — HOSPITAL ENCOUNTER (OUTPATIENT)
Dept: ULTRASOUND IMAGING | Age: 71
Discharge: HOME | End: 2021-06-28
Payer: MEDICARE

## 2021-06-28 DIAGNOSIS — I10 ESSENTIAL HYPERTENSION: ICD-10-CM

## 2021-06-28 DIAGNOSIS — I10 RESISTANT HYPERTENSION: ICD-10-CM

## 2021-06-28 PROCEDURE — 76770 US EXAM ABDO BACK WALL COMP: CPT

## 2021-07-15 ENCOUNTER — HOSPITAL ENCOUNTER (OUTPATIENT)
Dept: CARDIOLOGY | Age: 71
Discharge: HOME | End: 2021-07-15
Payer: MEDICARE

## 2021-07-15 LAB
LV EF: 58 %
LVEF MODALITY: NORMAL

## 2021-07-15 PROCEDURE — 93306 TTE W/DOPPLER COMPLETE: CPT

## 2021-12-14 DIAGNOSIS — F41.9 ANXIETY: Primary | ICD-10-CM

## 2021-12-14 RX ORDER — ESCITALOPRAM OXALATE 5 MG/1
5 TABLET ORAL DAILY
Qty: 90 TABLET | Refills: 1 | Status: SHIPPED | OUTPATIENT
Start: 2021-12-14 | End: 2022-01-04

## 2022-01-04 ENCOUNTER — OFFICE VISIT (OUTPATIENT)
Dept: PRIMARY CARE CLINIC | Age: 72
End: 2022-01-04
Payer: MEDICARE

## 2022-01-04 VITALS
SYSTOLIC BLOOD PRESSURE: 222 MMHG | WEIGHT: 121.2 LBS | HEART RATE: 57 BPM | TEMPERATURE: 97.3 F | DIASTOLIC BLOOD PRESSURE: 92 MMHG | HEIGHT: 60 IN | BODY MASS INDEX: 23.8 KG/M2

## 2022-01-04 DIAGNOSIS — R01.1 SYSTOLIC MURMUR: ICD-10-CM

## 2022-01-04 DIAGNOSIS — I10 ESSENTIAL HYPERTENSION: Primary | ICD-10-CM

## 2022-01-04 DIAGNOSIS — I10 ESSENTIAL HYPERTENSION: ICD-10-CM

## 2022-01-04 DIAGNOSIS — F41.9 ANXIETY: ICD-10-CM

## 2022-01-04 LAB
A/G RATIO: 1.5 (ref 1.1–2.2)
ALBUMIN SERPL-MCNC: 4.5 G/DL (ref 3.4–5)
ALP BLD-CCNC: 97 U/L (ref 40–129)
ALT SERPL-CCNC: 13 U/L (ref 10–40)
ANION GAP SERPL CALCULATED.3IONS-SCNC: 12 MMOL/L (ref 3–16)
AST SERPL-CCNC: 19 U/L (ref 15–37)
BILIRUB SERPL-MCNC: 0.3 MG/DL (ref 0–1)
BUN BLDV-MCNC: 19 MG/DL (ref 7–20)
CALCIUM SERPL-MCNC: 9.5 MG/DL (ref 8.3–10.6)
CHLORIDE BLD-SCNC: 101 MMOL/L (ref 99–110)
CHOLESTEROL, TOTAL: 225 MG/DL (ref 0–199)
CO2: 26 MMOL/L (ref 21–32)
CREAT SERPL-MCNC: 0.8 MG/DL (ref 0.6–1.2)
GFR AFRICAN AMERICAN: >60
GFR NON-AFRICAN AMERICAN: >60
GLUCOSE BLD-MCNC: 100 MG/DL (ref 70–99)
HDLC SERPL-MCNC: 53 MG/DL (ref 40–60)
LDL CHOLESTEROL CALCULATED: 156 MG/DL
POTASSIUM SERPL-SCNC: 5 MMOL/L (ref 3.5–5.1)
SODIUM BLD-SCNC: 139 MMOL/L (ref 136–145)
TOTAL PROTEIN: 7.5 G/DL (ref 6.4–8.2)
TRIGL SERPL-MCNC: 78 MG/DL (ref 0–150)
VLDLC SERPL CALC-MCNC: 16 MG/DL

## 2022-01-04 PROCEDURE — 99214 OFFICE O/P EST MOD 30 MIN: CPT | Performed by: FAMILY MEDICINE

## 2022-01-04 RX ORDER — ESCITALOPRAM OXALATE 5 MG/1
10 TABLET ORAL DAILY
Qty: 90 TABLET | Refills: 1 | Status: SHIPPED | OUTPATIENT
Start: 2022-01-04 | End: 2022-06-06

## 2022-01-04 ASSESSMENT — ENCOUNTER SYMPTOMS
CONSTIPATION: 0
DIARRHEA: 0
ABDOMINAL PAIN: 0
EYE PAIN: 0
SHORTNESS OF BREATH: 0
COUGH: 0

## 2022-01-04 NOTE — PROGRESS NOTES
Chief Complaint   Patient presents with    Hypertension    Annual Exam         ASSESSMENT/PLAN:  1. Essential hypertension  Patient states she doesn't want to take BP meds  Discussed risks, all questions answered. Discussed systolic murmur and echo results  She will work on anxiety levels. Update labs  She will follow up in 3 months per her request  Will continue to monitor at home and message me via [employer] if she is concerned. - Lipid Panel; Future  - Comprehensive Metabolic Panel; Future    2. Anxiety  Will continue lexapro but increase it to 10 mg daily  Follow up in 3 months per her request, sooner if needed  - escitalopram (LEXAPRO) 5 MG tablet; Take 2 tablets by mouth daily  Dispense: 90 tablet; Refill: 1    3. Systolic murmur  Stable  Reviewed echo  Discussed cardio referral option as well as monitoring  Discussed symptoms and signs to look for regarding aortic stenosis, lightheadedness and SHORTNESS OF BREATH.          HPI:  Kat Trujillo is a 66 y.o. (: 1950) here today for     BP Readings from Last 3 Encounters:   22 (!) 222/92   21 (!) 199/78   21 (!) 200/88       HTN  Previously followed by nephrology and has tried multiple medications but seems find a side effect for each. Rx: nothing    Discussed recent nephro work up and importance of renal eval and she states the doc did not have her best intentions at heart. \"He was just trying to give me more meds and my blood pressures weren't changing. \"    Compliant: no  Does check BP at home. Brought logs: 160s-170s/80s  Lab Results   Component Value Date    CREATININE 0.8 2021       GEORGIE  Rx: lexapro 5 mg daily  SEs: none  Feels as though it is helping bring her BP numbers down      HLD  Rx: statin  The ASCVD Risk score (Andressa Best, et al., 2013) failed to calculate for the following reasons: The valid systolic blood pressure range is 90 to 200 mmHg    Eats healthily and walks 4 times per week, 3 miles at a time. Walks dogs daily. Review of Systems   Constitutional: Negative for appetite change, chills, fatigue and fever. HENT: Negative for congestion. Eyes: Negative for pain and visual disturbance. Respiratory: Negative for cough and shortness of breath. Cardiovascular: Negative for chest pain and palpitations. Gastrointestinal: Negative for abdominal pain, constipation and diarrhea. Genitourinary: Negative for difficulty urinating. Musculoskeletal: Negative for arthralgias. Skin: Negative for rash and wound. Neurological: Negative for dizziness, weakness, light-headedness and headaches. Hematological: Does not bruise/bleed easily. Psychiatric/Behavioral: Negative for behavioral problems. Past Medical History:   Diagnosis Date    Skin cancer 2018    s/p excision       Family History   Problem Relation Age of Onset    Heart Disease Mother     Stroke Mother     Heart Disease Father        Social History     Tobacco Use    Smoking status: Never Smoker    Smokeless tobacco: Never Used   Vaping Use    Vaping Use: Never used   Substance Use Topics    Alcohol use: Never    Drug use: Never       New Prescriptions    No medications on file       Meds Prior to visit:  Current Outpatient Medications on File Prior to Visit   Medication Sig Dispense Refill    fluticasone (FLONASE) 50 MCG/ACT nasal spray 1 spray by Each Nostril route daily       No current facility-administered medications on file prior to visit.      Allergies   Allergen Reactions    Amoxicillin     Norvasc [Amlodipine]        OBJECTIVE:  BP (!) 222/92 (Site: Right Upper Arm, Position: Sitting, Cuff Size: Medium Adult)   Pulse 57   Temp 97.3 °F (36.3 °C) (Axillary)   Ht 4' 11.5\" (1.511 m)   Wt 121 lb 3.2 oz (55 kg)   BMI 24.07 kg/m²   BP Readings from Last 2 Encounters:   01/04/22 (!) 222/92   08/30/21 (!) 199/78     Wt Readings from Last 3 Encounters:   01/04/22 121 lb 3.2 oz (55 kg)   08/30/21 127 lb (57.6 kg) 07/26/21 122 lb 8 oz (55.6 kg)       Physical Exam  Vitals reviewed. Constitutional:       General: She is not in acute distress. Appearance: Normal appearance. She is not ill-appearing. HENT:      Head: Normocephalic and atraumatic. Right Ear: External ear normal.      Left Ear: External ear normal.      Nose: Nose normal. No congestion. Mouth/Throat:      Mouth: Mucous membranes are moist.      Pharynx: Oropharynx is clear. No oropharyngeal exudate. Eyes:      Extraocular Movements: Extraocular movements intact. Conjunctiva/sclera: Conjunctivae normal.      Pupils: Pupils are equal, round, and reactive to light. Cardiovascular:      Rate and Rhythm: Normal rate and regular rhythm. Pulses: Normal pulses. Heart sounds: S1 normal and S2 normal. Murmur heard. Systolic murmur is present with a grade of 2/6. Pulmonary:      Effort: Pulmonary effort is normal. No respiratory distress. Breath sounds: Normal breath sounds. No stridor. No wheezing, rhonchi or rales. Abdominal:      General: Bowel sounds are normal.      Palpations: Abdomen is soft. Tenderness: There is no abdominal tenderness. Musculoskeletal:         General: Normal range of motion. Cervical back: Normal range of motion and neck supple. Right lower leg: No edema. Left lower leg: No edema. Skin:     General: Skin is warm. Capillary Refill: Capillary refill takes less than 2 seconds. Findings: No erythema or rash. Neurological:      General: No focal deficit present. Mental Status: She is alert and oriented to person, place, and time. Mental status is at baseline. Motor: No weakness. Coordination: Coordination normal.      Gait: Gait normal.   Psychiatric:         Mood and Affect: Mood normal.         Behavior: Behavior normal.         Thought Content:  Thought content normal.         Judgment: Judgment normal.         Lab Results   Component Value Date WBC 6.7 03/23/2021    HGB 14.5 03/23/2021    HCT 43.0 03/23/2021    MCV 90.3 03/23/2021     03/23/2021     Lab Results   Component Value Date     06/18/2021    K 4.7 06/18/2021    CL 99 06/18/2021    CO2 28 06/18/2021    BUN 18 06/18/2021    CREATININE 0.8 06/18/2021    GLUCOSE 117 (H) 06/18/2021    CALCIUM 9.7 06/18/2021    PROT 7.6 06/18/2021    LABALBU 4.6 06/18/2021    BILITOT 0.4 06/18/2021    ALKPHOS 102 06/18/2021    AST 21 06/18/2021    ALT 13 06/18/2021    LABGLOM >60 06/18/2021    GFRAA >60 06/18/2021    AGRATIO 1.5 06/18/2021    GLOB 3.0 06/18/2021     Lab Results   Component Value Date    CHOL 218 (H) 03/23/2021     Lab Results   Component Value Date    TRIG 78 03/23/2021     Lab Results   Component Value Date    HDL 51 03/23/2021     Lab Results   Component Value Date    LDLCALC 151 (H) 03/23/2021     Lab Results   Component Value Date    LABVLDL 16 03/23/2021     Lab Results   Component Value Date    LABA1C 6.9 06/18/2021       Discussed use, benefit, and side effects of prescribed medications. Barriers to medication compliance addressed. All patient questions answered. Pt voiced understanding. RTC No follow-ups on file. No future appointments.     Cherylene Nail, MD  1/4/2022  1:07 PM

## 2022-02-18 ENCOUNTER — OFFICE VISIT (OUTPATIENT)
Dept: PRIMARY CARE CLINIC | Age: 72
End: 2022-02-18
Payer: MEDICARE

## 2022-02-18 VITALS
HEART RATE: 53 BPM | RESPIRATION RATE: 16 BRPM | DIASTOLIC BLOOD PRESSURE: 86 MMHG | WEIGHT: 117.6 LBS | SYSTOLIC BLOOD PRESSURE: 204 MMHG | BODY MASS INDEX: 23.35 KG/M2 | TEMPERATURE: 97.3 F

## 2022-02-18 DIAGNOSIS — E78.2 MIXED HYPERLIPIDEMIA: ICD-10-CM

## 2022-02-18 DIAGNOSIS — I10 ESSENTIAL HYPERTENSION: Primary | ICD-10-CM

## 2022-02-18 DIAGNOSIS — F41.9 ANXIETY: ICD-10-CM

## 2022-02-18 DIAGNOSIS — R01.1 SYSTOLIC MURMUR: ICD-10-CM

## 2022-02-18 DIAGNOSIS — Z12.31 BREAST CANCER SCREENING BY MAMMOGRAM: ICD-10-CM

## 2022-02-18 DIAGNOSIS — R73.03 PREDIABETES: ICD-10-CM

## 2022-02-18 LAB — HBA1C MFR BLD: 6.2 %

## 2022-02-18 PROCEDURE — 83036 HEMOGLOBIN GLYCOSYLATED A1C: CPT | Performed by: FAMILY MEDICINE

## 2022-02-18 PROCEDURE — 99214 OFFICE O/P EST MOD 30 MIN: CPT | Performed by: FAMILY MEDICINE

## 2022-02-18 ASSESSMENT — ENCOUNTER SYMPTOMS
ABDOMINAL PAIN: 0
EYE PAIN: 0
CONSTIPATION: 0
COUGH: 0
DIARRHEA: 0
SHORTNESS OF BREATH: 0

## 2022-02-18 NOTE — PROGRESS NOTES
Chief Complaint   Patient presents with    Blood Pressure Check         ASSESSMENT/PLAN:  1. Essential hypertension  Elevated in triage x2  Always high in the office  Home numbers range from 150-170/80-90  Discussed this increasing risk of CVD along with her murmur but she is not willing to take medication. Follow-up in 3 months to continue to monitor  Eats a healthy diet and walks daily as well. States she feels well, no shortness of breath, chest pain or pressure    2. Anxiety  She is taking Lexapro only as needed  Described, once again, that this medication unfortunately does not work if she takes it as needed  She understands proper administration now and will take it daily  Continue Lexapro 10 mg daily    3. Systolic murmur  Still present, no worsening  No chest pain or pressure  No lightheadedness or dizziness    4. Breast cancer screening by mammogram  Ordered  - [facility] DIGITAL SCREENING AUGMENTED BILATERAL; Future    5. Prediabetes  Ordered  Trended down from 6.9% to 6.2%  Continue working on [employer] and walking daily  Walks 2 dogs [employer]le regularly  - POCT glycosylated hemoglobin (Hb A1C)         HPI:  Aki Bo is a 66 y.o. (: 1950) here today for HTN and GEORGIE. BP Readings from Last 3 Encounters:   22 (!) 204/86   22 (!) 222/92   21 (!) 199/78     HTN  Previously followed by nephrology and has tried multiple medications but seems find a side effect for each. Rx: nothing     Discussed recent nephro work up and importance of renal eval and she states the doc did not have her best intentions at heart. \"He was just trying to give me more meds and my blood pressures weren't changing. \"     Compliant: no  Does check BP at home.    Brought logs: 160s-170s/80s  Lab Results   Component Value Date    CREATININE 0.8 2022     GEORGIE  Rx: lexapro 5 mg daily  SEs: none  Feels as though it is helping bring her BP numbers down    Hyperlipidemia  Patient is following a low fat, low cholesterol diet. is  exercising regularly. OTC Supplements: none. ASA: no  Lab Results   Component Value Date    ALT 13 01/04/2022    AST 19 01/04/2022     Lab Results   Component Value Date    CHOL 225 (H) 01/04/2022     Lab Results   Component Value Date    TRIG 78 01/04/2022     Lab Results   Component Value Date    HDL 53 01/04/2022     Lab Results   Component Value Date    LDLCALC 156 (H) 01/04/2022     The ASCVD Risk score (Tracey Jones et al., 2013) failed to calculate for the following reasons: The valid systolic blood pressure range is 90 to 200 mmHg    Would like her A1c checked  Last check was 6/20/2021 and 6.9%. Would like to update her mammogram      Review of Systems   Constitutional: Negative for appetite change, chills, fatigue and fever. HENT: Negative for congestion. Eyes: Negative for pain and visual disturbance. Respiratory: Negative for cough and shortness of breath. Cardiovascular: Negative for chest pain and palpitations. Gastrointestinal: Negative for abdominal pain, constipation and diarrhea. Genitourinary: Negative for difficulty urinating. Musculoskeletal: Negative for arthralgias. Skin: Negative for rash and wound. Neurological: Negative for dizziness, weakness, light-headedness and headaches. Hematological: Does not bruise/bleed easily. Psychiatric/Behavioral: Negative for behavioral problems.        Past Medical History:   Diagnosis Date    Skin cancer 2018    s/p excision       Family History   Problem Relation Age of Onset    Heart Disease Mother     Stroke Mother     Heart Disease Father        Social History     Tobacco Use    Smoking status: Never Smoker    Smokeless tobacco: Never Used   Vaping Use    Vaping Use: Never used   Substance Use Topics    Alcohol use: Never    Drug use: Never       New Prescriptions    No medications on file       Meds Prior to visit:  Current Outpatient Medications on File Prior to Visit Medication Sig Dispense Refill    escitalopram (LEXAPRO) 5 MG tablet Take 2 tablets by mouth daily 90 tablet 1    fluticasone (FLONASE) 50 MCG/ACT nasal spray 1 spray by Each Nostril route daily       No current facility-administered medications on file prior to visit. Allergies   Allergen Reactions    Amoxicillin     Norvasc [Amlodipine]        OBJECTIVE:  BP (!) 204/86   Pulse 53   Temp 97.3 °F (36.3 °C) (Temporal)   Resp 16   Wt 117 lb 9.6 oz (53.3 kg)   Breastfeeding No   BMI 23.35 kg/m²   BP Readings from Last 2 Encounters:   02/18/22 (!) 204/86   01/04/22 (!) 222/92     Wt Readings from Last 3 Encounters:   02/18/22 117 lb 9.6 oz (53.3 kg)   01/04/22 121 lb 3.2 oz (55 kg)   08/30/21 127 lb (57.6 kg)       Physical Exam  Vitals reviewed. Constitutional:       General: She is not in acute distress. Appearance: Normal appearance. She is not ill-appearing. HENT:      Head: Normocephalic and atraumatic. Right Ear: External ear normal.      Left Ear: External ear normal.      Nose: Nose normal. No congestion. Mouth/Throat:      Mouth: Mucous membranes are moist.      Pharynx: Oropharynx is clear. No oropharyngeal exudate. Eyes:      Extraocular Movements: Extraocular movements intact. Conjunctiva/sclera: Conjunctivae normal.      Pupils: Pupils are equal, round, and reactive to light. Cardiovascular:      Rate and Rhythm: Normal rate and regular rhythm. Pulses: Normal pulses. Heart sounds: S1 normal and S2 normal. Murmur heard. Systolic murmur is present with a grade of 2/6. Pulmonary:      Effort: Pulmonary effort is normal. No respiratory distress. Breath sounds: Normal breath sounds. No stridor. No wheezing, rhonchi or rales. Abdominal:      General: Bowel sounds are normal.      Palpations: Abdomen is soft. Tenderness: There is no abdominal tenderness. Musculoskeletal:         General: Normal range of motion.       Cervical back: Normal range of motion and neck supple. Right lower leg: No edema. Left lower leg: No edema. Skin:     General: Skin is warm. Capillary Refill: Capillary refill takes less than 2 seconds. Findings: No erythema or rash. Neurological:      General: No focal deficit present. Mental Status: She is alert and oriented to person, place, and time. Mental status is at baseline. Motor: No weakness. Coordination: Coordination normal.      Gait: Gait normal.   Psychiatric:         Mood and Affect: Mood normal.         Behavior: Behavior normal.         Thought Content: Thought content normal.         Judgment: Judgment normal.         Lab Results   Component Value Date    WBC 6.7 03/23/2021    HGB 14.5 03/23/2021    HCT 43.0 03/23/2021    MCV 90.3 03/23/2021     03/23/2021     Lab Results   Component Value Date     01/04/2022    K 5.0 01/04/2022     01/04/2022    CO2 26 01/04/2022    BUN 19 01/04/2022    CREATININE 0.8 01/04/2022    GLUCOSE 100 (H) 01/04/2022    CALCIUM 9.5 01/04/2022    PROT 7.5 01/04/2022    LABALBU 4.5 01/04/2022    BILITOT 0.3 01/04/2022    ALKPHOS 97 01/04/2022    AST 19 01/04/2022    ALT 13 01/04/2022    LABGLOM >60 01/04/2022    GFRAA >60 01/04/2022    AGRATIO 1.5 01/04/2022    GLOB 3.0 06/18/2021     Lab Results   Component Value Date    CHOL 225 (H) 01/04/2022    CHOL 218 (H) 03/23/2021     Lab Results   Component Value Date    TRIG 78 01/04/2022    TRIG 78 03/23/2021     Lab Results   Component Value Date    HDL 53 01/04/2022    HDL 51 03/23/2021     Lab Results   Component Value Date    LDLCALC 156 (H) 01/04/2022    LDLCALC 151 (H) 03/23/2021     Lab Results   Component Value Date    LABVLDL 16 01/04/2022    LABVLDL 16 03/23/2021     Lab Results   Component Value Date    LABA1C 6.2 02/18/2022         Discussed use, benefit, and side effects of prescribed medications. Barriers to medication compliance addressed. All patient questions answered.   Pt voiced understanding. RTC Return in about 3 months (around 5/18/2022).     Future Appointments   Date Time Provider [facility]   6/7/2022  8:00 AM MD Camille Arechiga MD  2/18/2022  10:22 AM

## 2022-03-04 ENCOUNTER — HOSPITAL ENCOUNTER (OUTPATIENT)
Dept: WOMENS IMAGING | Age: 72
Discharge: HOME | End: 2022-03-04
Payer: MEDICARE

## 2022-03-04 VITALS — HEIGHT: 59 IN | BODY MASS INDEX: 23.18 KG/M2 | WEIGHT: 115 LBS

## 2022-03-04 DIAGNOSIS — Z12.31 BREAST CANCER SCREENING BY MAMMOGRAM: ICD-10-CM

## 2022-03-04 PROCEDURE — 77063 BREAST TOMOSYNTHESIS BI: CPT

## 2022-06-06 DIAGNOSIS — F41.9 ANXIETY: ICD-10-CM

## 2022-06-06 RX ORDER — ESCITALOPRAM OXALATE 5 MG/1
10 TABLET ORAL DAILY
Qty: 90 TABLET | Refills: 1 | Status: SHIPPED | OUTPATIENT
Start: 2022-06-06 | End: 2022-06-07

## 2022-06-07 ENCOUNTER — OFFICE VISIT (OUTPATIENT)
Dept: PRIMARY CARE CLINIC | Age: 72
End: 2022-06-07
Payer: MEDICARE

## 2022-06-07 VITALS
TEMPERATURE: 98.2 F | DIASTOLIC BLOOD PRESSURE: 91 MMHG | HEART RATE: 57 BPM | BODY MASS INDEX: 22.9 KG/M2 | SYSTOLIC BLOOD PRESSURE: 225 MMHG | WEIGHT: 113.4 LBS

## 2022-06-07 DIAGNOSIS — F41.9 ANXIETY: ICD-10-CM

## 2022-06-07 DIAGNOSIS — E78.2 MIXED HYPERLIPIDEMIA: ICD-10-CM

## 2022-06-07 DIAGNOSIS — R73.03 PREDIABETES: ICD-10-CM

## 2022-06-07 DIAGNOSIS — L65.9 HAIR LOSS: ICD-10-CM

## 2022-06-07 DIAGNOSIS — I10 ESSENTIAL HYPERTENSION: Primary | ICD-10-CM

## 2022-06-07 DIAGNOSIS — R01.1 SYSTOLIC MURMUR: ICD-10-CM

## 2022-06-07 LAB — TSH REFLEX: 5.8 UIU/ML (ref 0.27–4.2)

## 2022-06-07 PROCEDURE — 99214 OFFICE O/P EST MOD 30 MIN: CPT | Performed by: FAMILY MEDICINE

## 2022-06-07 PROCEDURE — 1123F ACP DISCUSS/DSCN MKR DOCD: CPT | Performed by: FAMILY MEDICINE

## 2022-06-07 RX ORDER — ESCITALOPRAM OXALATE 5 MG/1
10 TABLET ORAL DAILY
Qty: 90 TABLET | Refills: 1 | Status: SHIPPED | OUTPATIENT
Start: 2022-06-07 | End: 2022-09-08 | Stop reason: SDUPTHER

## 2022-06-07 ASSESSMENT — PATIENT HEALTH QUESTIONNAIRE - PHQ9
SUM OF ALL RESPONSES TO PHQ QUESTIONS 1-9: 0
SUM OF ALL RESPONSES TO PHQ9 QUESTIONS 1 & 2: 0
2. FEELING DOWN, DEPRESSED OR HOPELESS: 0
SUM OF ALL RESPONSES TO PHQ QUESTIONS 1-9: 0
1. LITTLE INTEREST OR PLEASURE IN DOING THINGS: 0
SUM OF ALL RESPONSES TO PHQ QUESTIONS 1-9: 0
SUM OF ALL RESPONSES TO PHQ QUESTIONS 1-9: 0

## 2022-06-07 ASSESSMENT — ENCOUNTER SYMPTOMS
CONSTIPATION: 0
DIARRHEA: 0
SHORTNESS OF BREATH: 0
COUGH: 0
EYE PAIN: 0
ABDOMINAL PAIN: 0

## 2022-06-07 NOTE — PROGRESS NOTES
Chief Complaint   Patient presents with    Hypertension         ASSESSMENT/PLAN:  1. Essential hypertension  Elevated in triage - Always high in the office  Home numbers range from 150-210/  Discussed this increasing risk of CVD along with her murmur but she is not willing to take medication. Follow-up in 6 months to continue to monitor  Eats a healthy diet and walks daily as well. States she feels well, no shortness of breath, chest pain or pressure    2. Anxiety  She will restart Lexapro because her home BP numbers were better while on this  Described, once again, that this medication unfortunately does not work if she takes it as needed  Her dog Awilda Kidd is not doing well and will be 21 yrs old this yr. She understands proper administration now and will take it daily  Refilled. 3. Systolic murmur  Still present, no worsening  No chest pain or pressure  No lightheadedness or dizziness  Reviewed last Echo    4. Prediabetes  She would like to update A1c (at goal for her age @ 6.2%)  She doesn't want to take medication for this if A1c is above goal.  Continue working on healthy dietary choices and walking daily  Walks 2 dogs Awilda Kidd and Carmen regularly  Follow-up in 6 months to continue to monitor    5. Mixed hyperlipidemia  Would like to update lipid panel because she has changed her diet  Ordered and will follow up  She politely declines any discussion of statin medication  She does not want to take any more meds, not even aspirin  Follow-up in 6 months to continue to monitor    6. Hair loss  Main concern today -normal scalp exam  Seems to be sitting overall, no patchy loss  Discussed over-the-counter remedies like Rosemary oil, collagen and Nutrafol  She would like her thyroid function checked even if it is low or abnormal, she does not want to take medications. Follow-up if hair loss becomes more apparent or more severe      HPI:  Sharee Sanchez is a 67 y.o. (: 1950) here today for HTN and GEORGIE. BP Readings from Last 3 Encounters:   06/07/22 (!) 225/91   02/18/22 (!) 204/86   01/04/22 (!) 222/92       HTN  Previously followed by nephrology and has tried multiple medications but seems find a side effect for each. Rx: nothing  Compliant: no  Does check BP at home. Brought logs: 190s-200s  Lab Results   Component Value Date    CREATININE 0.8 01/04/2022     GEORGIE  Rx: lexapro 10 mg daily  SEs: none  Feels as though it was helping bring her BP numbers down but stopped taking Lexapro 2 weeks ago. Discussed her better blood pressure numbers at home while on Lexapro and she decided to restart it. Hyperlipidemia  Patient is following a low fat, low cholesterol diet. is  exercising regularly. OTC Supplements: none. ASA: no  Lab Results   Component Value Date    ALT 13 01/04/2022    AST 19 01/04/2022     Lab Results   Component Value Date    CHOL 225 (H) 01/04/2022     Lab Results   Component Value Date    TRIG 78 01/04/2022     Lab Results   Component Value Date    HDL 53 01/04/2022     Lab Results   Component Value Date    LDLCALC 156 (H) 01/04/2022     The ASCVD Risk score (Scooby Tee, et al., 2013) failed to calculate for the following reasons: The valid systolic blood pressure range is 90 to 200 mmHg    Would like her A1c checked  Last check was 6/20/2021 and 6.9%. Hair loss is her main concern today, last few months she has noticed thinning overall, no patchy loss. Has not tried any remedies yet but would like to discuss options. No constipation or dry skin. No palpitations. Review of Systems   Constitutional: Negative for appetite change, chills, fatigue and fever. HENT: Negative for congestion. Eyes: Negative for pain and visual disturbance. Respiratory: Negative for cough and shortness of breath. Cardiovascular: Negative for chest pain and palpitations. Gastrointestinal: Negative for abdominal pain, constipation and diarrhea.    Genitourinary: Negative for difficulty urinating. Musculoskeletal: Negative for arthralgias. Skin: Negative for rash and wound. Neurological: Negative for dizziness, weakness, light-headedness and headaches. Hematological: Does not bruise/bleed easily. Psychiatric/Behavioral: Negative for behavioral problems. Past Medical History:   Diagnosis Date    Skin cancer 2018    s/p excision       Family History   Problem Relation Age of Onset    Heart Disease Mother     Stroke Mother     Heart Disease Father     Ovarian Cancer Paternal Grandmother     Breast Cancer Maternal Aunt     Breast Cancer Maternal Aunt        Social History     Tobacco Use    Smoking status: Never Smoker    Smokeless tobacco: Never Used   Vaping Use    Vaping Use: Never used   Substance Use Topics    Alcohol use: Never    Drug use: Never       New Prescriptions    No medications on file       Meds Prior to visit:  Current Outpatient Medications on File Prior to Visit   Medication Sig Dispense Refill    fluticasone (FLONASE) 50 MCG/ACT nasal spray 1 spray by Each Nostril route daily       No current facility-administered medications on file prior to visit. Allergies   Allergen Reactions    Amoxicillin     Norvasc [Amlodipine]        OBJECTIVE:  BP (!) 225/91   Pulse 57   Temp 98.2 °F (36.8 °C) (Temporal)   Wt 113 lb 6.4 oz (51.4 kg)   Breastfeeding No   BMI 22.90 kg/m²   BP Readings from Last 2 Encounters:   06/07/22 (!) 225/91   02/18/22 (!) 204/86     Wt Readings from Last 3 Encounters:   06/07/22 113 lb 6.4 oz (51.4 kg)   03/04/22 115 lb (52.2 kg)   02/18/22 117 lb 9.6 oz (53.3 kg)       Physical Exam  Vitals reviewed. Constitutional:       General: She is not in acute distress. Appearance: Normal appearance. She is not ill-appearing. HENT:      Head: Normocephalic and atraumatic. Right Ear: External ear normal.      Left Ear: External ear normal.      Nose: Nose normal. No congestion.       Mouth/Throat:      Mouth: Mucous membranes are moist.      Pharynx: Oropharynx is clear. No oropharyngeal exudate. Eyes:      Extraocular Movements: Extraocular movements intact. Conjunctiva/sclera: Conjunctivae normal.      Pupils: Pupils are equal, round, and reactive to light. Cardiovascular:      Rate and Rhythm: Normal rate and regular rhythm. Pulses: Normal pulses. Heart sounds: S1 normal and S2 normal. Murmur heard. Systolic murmur is present with a grade of 2/6. Pulmonary:      Effort: Pulmonary effort is normal. No respiratory distress. Breath sounds: Normal breath sounds. No stridor. No wheezing, rhonchi or rales. Abdominal:      General: Bowel sounds are normal.      Palpations: Abdomen is soft. Tenderness: There is no abdominal tenderness. Musculoskeletal:         General: Normal range of motion. Cervical back: Normal range of motion and neck supple. Right lower leg: No edema. Left lower leg: No edema. Skin:     General: Skin is warm. Capillary Refill: Capillary refill takes less than 2 seconds. Findings: No erythema or rash. Neurological:      General: No focal deficit present. Mental Status: She is alert and oriented to person, place, and time. Mental status is at baseline. Motor: No weakness. Coordination: Coordination normal.      Gait: Gait normal.   Psychiatric:         Mood and Affect: Mood normal.         Behavior: Behavior normal.         Thought Content:  Thought content normal.         Judgment: Judgment normal.         Lab Results   Component Value Date    WBC 6.7 03/23/2021    HGB 14.5 03/23/2021    HCT 43.0 03/23/2021    MCV 90.3 03/23/2021     03/23/2021     Lab Results   Component Value Date     01/04/2022    K 5.0 01/04/2022     01/04/2022    CO2 26 01/04/2022    BUN 19 01/04/2022    CREATININE 0.8 01/04/2022    GLUCOSE 100 (H) 01/04/2022    CALCIUM 9.5 01/04/2022    PROT 7.5 01/04/2022    LABALBU 4.5 01/04/2022    BILITOT 0.3 01/04/2022    ALKPHOS 97 01/04/2022    AST 19 01/04/2022    ALT 13 01/04/2022    LABGLOM >60 01/04/2022    GFRAA >60 01/04/2022    AGRATIO 1.5 01/04/2022    GLOB 3.0 06/18/2021     Lab Results   Component Value Date    CHOL 225 (H) 01/04/2022    CHOL 218 (H) 03/23/2021     Lab Results   Component Value Date    TRIG 78 01/04/2022    TRIG 78 03/23/2021     Lab Results   Component Value Date    HDL 53 01/04/2022    HDL 51 03/23/2021     Lab Results   Component Value Date    LDLCALC 156 (H) 01/04/2022    LDLCALC 151 (H) 03/23/2021     Lab Results   Component Value Date    LABVLDL 16 01/04/2022    LABVLDL 16 03/23/2021     Lab Results   Component Value Date    LABA1C 6.2 02/18/2022         Discussed use, benefit, and side effects of prescribed medications. Barriers to medication compliance addressed. All patient questions answered. Pt voiced understanding. RTC Return in about 6 months (around 12/7/2022).     Future Appointments   Date Time Provider Sonja Quiroga   12/13/2022  8:00 AM MD Keagan Gonzalez MD  6/7/2022  8:36 AM

## 2022-06-08 LAB
CHOLESTEROL, TOTAL: 239 MG/DL (ref 0–199)
ESTIMATED AVERAGE GLUCOSE: 131.2 MG/DL
HBA1C MFR BLD: 6.2 %
HDLC SERPL-MCNC: 51 MG/DL (ref 40–60)
LDL CHOLESTEROL CALCULATED: 175 MG/DL
T4 FREE: 0.9 NG/DL (ref 0.9–1.8)
TRIGL SERPL-MCNC: 66 MG/DL (ref 0–150)
VLDLC SERPL CALC-MCNC: 13 MG/DL

## 2022-09-08 DIAGNOSIS — F41.9 ANXIETY: ICD-10-CM

## 2022-09-09 RX ORDER — ESCITALOPRAM OXALATE 5 MG/1
10 TABLET ORAL DAILY
Qty: 90 TABLET | Refills: 3 | Status: SHIPPED | OUTPATIENT
Start: 2022-09-09

## 2022-09-09 NOTE — TELEPHONE ENCOUNTER
Medication:   Requested Prescriptions     Pending Prescriptions Disp Refills    escitalopram (LEXAPRO) 5 MG tablet 90 tablet 3     Sig: Take 2 tablets by mouth daily       Last Filled:      Patient Phone Number: [phone] (home)     Last appt: 6/7/2022   Next appt: 12/13/2022  Return in about 6 months (around 12/7/2022).   Last PSA: No results found for: PSA

## 2022-11-03 ENCOUNTER — TELEPHONE (OUTPATIENT)
Dept: PRIMARY CARE CLINIC | Age: 72
End: 2022-11-03

## 2022-11-03 NOTE — TELEPHONE ENCOUNTER
Ar! My name is Francisco Javier Morales, I'm the nurse here at [facility]. I was writing to let you know we need to get you scheduled for your required medicare annual wellness visit. This can be done over the phone now as well. Please give our office a call to get that scheduled. Thank you and have a wonderful day!       Francisco Javier Morales N  [phone]

## 2022-11-09 ENCOUNTER — PATIENT MESSAGE (OUTPATIENT)
Dept: PRIMARY CARE CLINIC | Age: 72
End: 2022-11-09

## 2022-11-09 DIAGNOSIS — F41.9 ANXIETY: ICD-10-CM

## 2022-11-09 RX ORDER — ESCITALOPRAM OXALATE 5 MG/1
10 TABLET ORAL DAILY
Qty: 90 TABLET | Refills: 3 | Status: SHIPPED | OUTPATIENT
Start: 2022-11-09 | End: 2022-11-14

## 2022-11-09 NOTE — TELEPHONE ENCOUNTER
Medication:   Requested Prescriptions     Pending Prescriptions Disp Refills    escitalopram (LEXAPRO) 5 MG tablet 90 tablet 3     Sig: Take 2 tablets by mouth daily        Last Filled:  09/09/22 script did not have a pharmacy attached     Patient Phone Number: [phone] (home)     Last appt: 6/7/2022   Next appt: 12/13/2022    Last OARRS: No flowsheet data found.

## 2022-11-09 NOTE — TELEPHONE ENCOUNTER
From: Cheyenne Le  To: Dr. Bone Rich: 11/9/2022 3:55 PM EST  Subject: Refill Request    My Lexapro will be running out in about a week. Would you please order a 90 day supply (180) of the 5mg?     My pharmacy is still   [facility]  [address]  [phone]    Thank you,  Cheyenne Le

## 2022-11-11 DIAGNOSIS — F41.9 ANXIETY: ICD-10-CM

## 2022-11-14 RX ORDER — ESCITALOPRAM OXALATE 5 MG/1
TABLET ORAL
Qty: 60 TABLET | Refills: 3 | Status: SHIPPED | OUTPATIENT
Start: 2022-11-14

## 2022-11-14 NOTE — TELEPHONE ENCOUNTER
Medication:   Requested Prescriptions     Pending Prescriptions Disp Refills    escitalopram (LEXAPRO) 5 MG tablet [Pharmacy Med Name: ESCITALOPRAM 5 MG TABLET] 60 tablet      Sig: TAKE TWO TABLETS BY MOUTH DAILY        Last Filled: 11/9/22     Patient Phone Number: [phone] (home)     Last appt: 6/7/2022   Next appt: 12/13/2022    Last OARRS: No flowsheet data found.

## 2022-12-06 SDOH — HEALTH STABILITY: PHYSICAL HEALTH: ON AVERAGE, HOW MANY MINUTES DO YOU ENGAGE IN EXERCISE AT THIS LEVEL?: 30 MIN

## 2022-12-06 SDOH — HEALTH STABILITY: PHYSICAL HEALTH: ON AVERAGE, HOW MANY DAYS PER WEEK DO YOU ENGAGE IN MODERATE TO STRENUOUS EXERCISE (LIKE A BRISK WALK)?: 5 DAYS

## 2022-12-06 ASSESSMENT — PATIENT HEALTH QUESTIONNAIRE - PHQ9
SUM OF ALL RESPONSES TO PHQ QUESTIONS 1-9: 0
SUM OF ALL RESPONSES TO PHQ QUESTIONS 1-9: 0
1. LITTLE INTEREST OR PLEASURE IN DOING THINGS: 0
SUM OF ALL RESPONSES TO PHQ QUESTIONS 1-9: 0
SUM OF ALL RESPONSES TO PHQ9 QUESTIONS 1 & 2: 0
2. FEELING DOWN, DEPRESSED OR HOPELESS: 0
SUM OF ALL RESPONSES TO PHQ QUESTIONS 1-9: 0

## 2022-12-06 ASSESSMENT — LIFESTYLE VARIABLES
HOW OFTEN DO YOU HAVE A DRINK CONTAINING ALCOHOL: NEVER
HOW OFTEN DO YOU HAVE SIX OR MORE DRINKS ON ONE OCCASION: 1
HOW OFTEN DO YOU HAVE A DRINK CONTAINING ALCOHOL: 1
HOW MANY STANDARD DRINKS CONTAINING ALCOHOL DO YOU HAVE ON A TYPICAL DAY: PATIENT DOES NOT DRINK
HOW MANY STANDARD DRINKS CONTAINING ALCOHOL DO YOU HAVE ON A TYPICAL DAY: 0

## 2022-12-13 ENCOUNTER — OFFICE VISIT (OUTPATIENT)
Dept: PRIMARY CARE CLINIC | Age: 72
End: 2022-12-13
Payer: MEDICARE

## 2022-12-13 VITALS
BODY MASS INDEX: 23.59 KG/M2 | TEMPERATURE: 98.1 F | SYSTOLIC BLOOD PRESSURE: 202 MMHG | HEIGHT: 59 IN | WEIGHT: 117 LBS | HEART RATE: 58 BPM | OXYGEN SATURATION: 98 % | DIASTOLIC BLOOD PRESSURE: 90 MMHG

## 2022-12-13 DIAGNOSIS — H61.23 BILATERAL IMPACTED CERUMEN: ICD-10-CM

## 2022-12-13 DIAGNOSIS — Z00.00 INITIAL MEDICARE ANNUAL WELLNESS VISIT: Primary | ICD-10-CM

## 2022-12-13 DIAGNOSIS — R73.03 PREDIABETES: ICD-10-CM

## 2022-12-13 DIAGNOSIS — I10 RESISTANT HYPERTENSION: ICD-10-CM

## 2022-12-13 DIAGNOSIS — F41.9 ANXIETY: ICD-10-CM

## 2022-12-13 DIAGNOSIS — E78.2 MIXED HYPERLIPIDEMIA: ICD-10-CM

## 2022-12-13 DIAGNOSIS — R01.1 SYSTOLIC MURMUR: ICD-10-CM

## 2022-12-13 DIAGNOSIS — E03.8 SUBCLINICAL HYPOTHYROIDISM: ICD-10-CM

## 2022-12-13 LAB
A/G RATIO: 1.4 (ref 1.1–2.2)
ALBUMIN SERPL-MCNC: 4.1 G/DL (ref 3.4–5)
ALP BLD-CCNC: 111 U/L (ref 40–129)
ALT SERPL-CCNC: 11 U/L (ref 10–40)
ANION GAP SERPL CALCULATED.3IONS-SCNC: 14 MMOL/L (ref 3–16)
AST SERPL-CCNC: 17 U/L (ref 15–37)
BASOPHILS ABSOLUTE: 0 K/UL (ref 0–0.2)
BASOPHILS RELATIVE PERCENT: 0.6 %
BILIRUB SERPL-MCNC: <0.2 MG/DL (ref 0–1)
BUN BLDV-MCNC: 24 MG/DL (ref 7–20)
CALCIUM SERPL-MCNC: 9.4 MG/DL (ref 8.3–10.6)
CHLORIDE BLD-SCNC: 103 MMOL/L (ref 99–110)
CHOLESTEROL, TOTAL: 234 MG/DL (ref 0–199)
CO2: 26 MMOL/L (ref 21–32)
CREAT SERPL-MCNC: 0.6 MG/DL (ref 0.6–1.2)
EOSINOPHILS ABSOLUTE: 0.2 K/UL (ref 0–0.6)
EOSINOPHILS RELATIVE PERCENT: 2.1 %
GFR SERPL CREATININE-BSD FRML MDRD: >60 ML/MIN/{1.73_M2}
GLUCOSE BLD-MCNC: 95 MG/DL (ref 70–99)
HCT VFR BLD CALC: 40.2 % (ref 36–48)
HDLC SERPL-MCNC: 51 MG/DL (ref 40–60)
HEMOGLOBIN: 13.5 G/DL (ref 12–16)
LDL CHOLESTEROL CALCULATED: 168 MG/DL
LYMPHOCYTES ABSOLUTE: 1.8 K/UL (ref 1–5.1)
LYMPHOCYTES RELATIVE PERCENT: 23.9 %
MCH RBC QN AUTO: 30.5 PG (ref 26–34)
MCHC RBC AUTO-ENTMCNC: 33.6 G/DL (ref 31–36)
MCV RBC AUTO: 90.7 FL (ref 80–100)
MONOCYTES ABSOLUTE: 0.6 K/UL (ref 0–1.3)
MONOCYTES RELATIVE PERCENT: 7.9 %
NEUTROPHILS ABSOLUTE: 4.9 K/UL (ref 1.7–7.7)
NEUTROPHILS RELATIVE PERCENT: 65.5 %
PDW BLD-RTO: 12.8 % (ref 12.4–15.4)
PLATELET # BLD: 254 K/UL (ref 135–450)
PMV BLD AUTO: 10.9 FL (ref 5–10.5)
POTASSIUM SERPL-SCNC: 4.3 MMOL/L (ref 3.5–5.1)
RBC # BLD: 4.44 M/UL (ref 4–5.2)
SODIUM BLD-SCNC: 143 MMOL/L (ref 136–145)
T4 FREE: 0.7 NG/DL (ref 0.9–1.8)
TOTAL PROTEIN: 7.1 G/DL (ref 6.4–8.2)
TRIGL SERPL-MCNC: 77 MG/DL (ref 0–150)
TSH REFLEX: 8.81 UIU/ML (ref 0.27–4.2)
VLDLC SERPL CALC-MCNC: 15 MG/DL
WBC # BLD: 7.4 K/UL (ref 4–11)

## 2022-12-13 PROCEDURE — G8484 FLU IMMUNIZE NO ADMIN: HCPCS | Performed by: FAMILY MEDICINE

## 2022-12-13 PROCEDURE — 3078F DIAST BP <80 MM HG: CPT | Performed by: FAMILY MEDICINE

## 2022-12-13 PROCEDURE — 3017F COLORECTAL CA SCREEN DOC REV: CPT | Performed by: FAMILY MEDICINE

## 2022-12-13 PROCEDURE — 1123F ACP DISCUSS/DSCN MKR DOCD: CPT | Performed by: FAMILY MEDICINE

## 2022-12-13 PROCEDURE — 3077F SYST BP >= 140 MM HG: CPT | Performed by: FAMILY MEDICINE

## 2022-12-13 RX ORDER — ESCITALOPRAM OXALATE 5 MG/1
15 TABLET ORAL DAILY
Qty: 270 TABLET | Refills: 3 | Status: SHIPPED | OUTPATIENT
Start: 2022-12-13 | End: 2023-03-13

## 2022-12-13 ASSESSMENT — ENCOUNTER SYMPTOMS
DIARRHEA: 0
SHORTNESS OF BREATH: 0
EYE PAIN: 0
COUGH: 0
CONSTIPATION: 0
ABDOMINAL PAIN: 0

## 2022-12-13 ASSESSMENT — PATIENT HEALTH QUESTIONNAIRE - PHQ9
1. LITTLE INTEREST OR PLEASURE IN DOING THINGS: 0
2. FEELING DOWN, DEPRESSED OR HOPELESS: 0
SUM OF ALL RESPONSES TO PHQ QUESTIONS 1-9: 0
SUM OF ALL RESPONSES TO PHQ QUESTIONS 1-9: 0
SUM OF ALL RESPONSES TO PHQ9 QUESTIONS 1 & 2: 0
SUM OF ALL RESPONSES TO PHQ QUESTIONS 1-9: 0
SUM OF ALL RESPONSES TO PHQ QUESTIONS 1-9: 0

## 2022-12-13 NOTE — PATIENT INSTRUCTIONS
Personalized Preventive Plan for Michele Smith Peak - 12/13/2022  Medicare offers a range of preventive health benefits. Some of the tests and screenings are paid in full while other may be subject to a deductible, co-insurance, and/or copay. Some of these benefits include a comprehensive review of your medical history including lifestyle, illnesses that may run in your family, and various assessments and screenings as appropriate. After reviewing your medical record and screening and assessments performed today your provider may have ordered immunizations, labs, imaging, and/or referrals for you. A list of these orders (if applicable) as well as your Preventive Care list are included within your After Visit Summary for your review. Other Preventive Recommendations:    A preventive eye exam performed by an eye specialist is recommended every 1-2 years to screen for glaucoma; cataracts, macular degeneration, and other eye disorders. A preventive dental visit is recommended every 6 months. Try to get at least 150 minutes of exercise per week or 10,000 steps per day on a pedometer . Order or download the FREE \"Exercise & Physical Activity: Your Everyday Guide\" from [employer]. Call 8-542.606.8537 or search [employer] online. You need 5645-9436 mg of calcium and 5598-7492 IU of vitamin D per day. It is possible to meet your calcium requirement with diet alone, but a vitamin D supplement is usually necessary to meet this goal.  When exposed to the sun, use a sunscreen that protects against both UVA and UVB radiation with an SPF of 30 or greater. Reapply every 2 to 3 hours or after sweating, drying off with a towel, or swimming. Always wear a seat belt when traveling in a car. Always wear a helmet when riding a bicycle or motorcycle.

## 2022-12-13 NOTE — PROGRESS NOTES
Medicare Annual Wellness Visit    Jessica Le is here for Medicare AWV    Assessment & Plan   Initial Medicare annual wellness visit  Resistant hypertension  Uncontrolled in triage x2  Produced home numbers ranging from 319-013 systolic  Has chosen not to follow-up with nephrology or cardiology  Update renal function and complete blood count  Continue to work on dietary changes and lifestyle modifications  -     Comprehensive Metabolic Panel; Future  -     CBC with Auto Differential; Future  Anxiety  Uncontrolled  Increase Lexapro to 15 mg daily  Check thyroid function  -     escitalopram (LEXAPRO) 5 MG tablet; Take 3 tablets by mouth daily, Disp-270 tablet, R-3Normal  -     TSH with Reflex; Future  Mixed hyperlipidemia  The ASCVD Risk score (Marybeth CARBONE, et al., 2019) failed to calculate for the following reasons: The valid systolic blood pressure range is 90 to 200 mmHg  Politely declines statin but has started eating more protein to give her more energy and would like her lipid panel updated  -     Lipid Panel; Future  Bilateral impacted cerumen  -     CA REMOVAL IMPACTED CERUMEN IRRIGATION/LVG UNILAT  -     carbamide peroxide (DEBROX) 6.5 % otic solution; Place 5 drops in ear(s) 2 times daily, Disp-15 mL, R-3Normal  Prediabetes  -     Hemoglobin A1C; Future  Subclinical hypothyroidism  -     TSH with Reflex; Future  Systolic murmur  Reviewed echo once more  Offered cardiology referral for discussion of treatment options in the setting of her feeling lightheaded when she stands and she politely declines    Follow-up in 3 months to gauge improvement of chronic conditions. I have spent 30 minutes of face to face time for education and counseling with the patient, as well as chart review, for ear pain, prediabetes, uncontrolled anxiety and HTN and HLD.       Recommendations for Preventive Services Due: see orders and patient instructions/AVS.  Recommended screening schedule for the next 5-10 years is provided to the patient in written form: see Patient Instructions/AVS.     Return in about 3 months (around 3/13/2023). Subjective       Patient's complete Health Risk Assessment and screening values have been reviewed and are found in Flowsheets. The following problems were reviewed today and where indicated follow up appointments were made and/or referrals ordered. Positive Risk Factor Screenings with Interventions:                       Advanced Directives:  Do you have a Living Will?: (!) No    Intervention:  has NO advanced directive - not interested in additional information           Objective   Vitals:    12/13/22 0809   BP: (!) 202/90   Pulse: 58   Temp: 98.1 °F (36.7 °C)   SpO2: 98%   Weight: 117 lb (53.1 kg)   Height: 4' 11\" (1.499 m)      Body mass index is 23.63 kg/m². Allergies   Allergen Reactions    Amoxicillin     Norvasc [Amlodipine]      Prior to Visit Medications    Medication Sig Taking?  Authorizing Provider   escitalopram (LEXAPRO) 5 MG tablet Take 3 tablets by mouth daily Yes Reji Jones MD   carbamide peroxide (DEBROX) 6.5 % otic solution Place 5 drops in ear(s) 2 times daily Yes Reji Jones MD   fluticasone (FLONASE) 50 MCG/ACT nasal spray 1 spray by Each Nostril route daily Yes Historical Provider, MD Blanc (Including outside providers/suppliers regularly involved in providing care):   Patient Care Team:  Reji Jones MD as PCP - General (Family Medicine)  Reji Jones MD as PCP - [facility] Empaneled Provider     Reviewed and updated this visit:  Tobacco  Allergies  Meds  Problems  Med Hx  Surg Hx  Soc Hx  Fam Hx

## 2022-12-13 NOTE — PROGRESS NOTES
Chief Complaint   Patient presents with    Medicare AW         ASSESSMENT/PLAN:  1. Essential hypertension  Elevated in triage - Always high in the office  Home numbers range from 150-210/  Discussed this increasing risk of CVD along with her murmur but she is not willing to take medication. Follow-up in 6 months to continue to monitor  Eats a healthy diet and walks daily as well. States she feels well, no shortness of breath, chest pain or pressure    2. Anxiety  She will restart Lexapro because her home BP numbers were better while on this  Described, once again, that this medication unfortunately does not work if she takes it as needed  Her dog Ramo Age is not doing well and will be 21 yrs old this yr. She understands proper administration now and will take it daily  Refilled. 3. Systolic murmur  Still present, no worsening  No chest pain or pressure  No lightheadedness or dizziness  Reviewed last Echo    4. Prediabetes  She would like to update A1c (at goal for her age @ 6.2%)  She doesn't want to take medication for this if A1c is above goal.  Continue working on healthy dietary choices and walking daily  Walks 2 dogs Ramo Age and Doodle regularly  Follow-up in 6 months to continue to monitor    5. Mixed hyperlipidemia  Would like to update lipid panel because she has changed her diet  Ordered and will follow up  She politely declines any discussion of statin medication  She does not want to take any more meds, not even aspirin  Follow-up in 6 months to continue to monitor    6. Hair loss  Main concern today -normal scalp exam  Seems to be sitting overall, no patchy loss  Discussed over-the-counter remedies like Rosemary oil, collagen and Nutrafol  She would like her thyroid function checked even if it is low or abnormal, she does not want to take medications. Follow-up if hair loss becomes more apparent or more severe      HPI:  Caro Lyons is a 67 y.o. (: 1950) here today for HTN and GEORGIE. BP Readings from Last 3 Encounters:   12/13/22 (!) 202/90   06/07/22 (!) 225/91   02/18/22 (!) 204/86       HTN  Previously followed by nephrology and has tried multiple medications but seems find a side effect for each. Rx: nothing  Compliant: no  Does check BP at home. Brought logs: 190s-200s  Lab Results   Component Value Date    CREATININE 0.8 01/04/2022     GEORGIE  Rx: lexapro 10 mg daily  SEs: none  Feels as though it was helping bring her BP numbers down but stopped taking Lexapro 2 weeks ago. Discussed her better blood pressure numbers at home while on Lexapro and she decided to restart it. Hyperlipidemia  Patient is following a low fat, low cholesterol diet. is  exercising regularly. OTC Supplements: none. ASA: no  Lab Results   Component Value Date    ALT 13 01/04/2022    AST 19 01/04/2022     Lab Results   Component Value Date    CHOL 239 (H) 06/07/2022     Lab Results   Component Value Date    TRIG 66 06/07/2022     Lab Results   Component Value Date    HDL 51 06/07/2022     Lab Results   Component Value Date    LDLCALC 175 (H) 06/07/2022     The ASCVD Risk score (Marybeth DK, et al., 2019) failed to calculate for the following reasons: The valid systolic blood pressure range is 90 to 200 mmHg    Would like her A1c checked  Last check was 6/20/2021 and 6.9%. Hair loss is her main concern today, last few months she has noticed thinning overall, no patchy loss. Has not tried any remedies yet but would like to discuss options. No constipation or dry skin. No palpitations. Review of Systems   Constitutional:  Negative for appetite change, chills, fatigue and fever. HENT:  Negative for congestion. Eyes:  Negative for pain and visual disturbance. Respiratory:  Negative for cough and shortness of breath. Cardiovascular:  Negative for chest pain and palpitations. Gastrointestinal:  Negative for abdominal pain, constipation and diarrhea.    Genitourinary:  Negative for difficulty urinating. Musculoskeletal:  Negative for arthralgias. Skin:  Negative for rash and wound. Neurological:  Negative for dizziness, weakness, light-headedness and headaches. Hematological:  Does not bruise/bleed easily. Psychiatric/Behavioral:  Negative for behavioral problems. Past Medical History:   Diagnosis Date    Skin cancer 2018    s/p excision       Family History   Problem Relation Age of Onset    Heart Disease Mother     Stroke Mother     Heart Disease Father     Ovarian Cancer Paternal Grandmother     Breast Cancer Maternal Aunt     Breast Cancer Maternal Aunt        Social History     Tobacco Use    Smoking status: Never    Smokeless tobacco: Never   Vaping Use    Vaping Use: Never used   Substance Use Topics    Alcohol use: Never    Drug use: Never       New Prescriptions    No medications on file       Meds Prior to visit:  Current Outpatient Medications on File Prior to Visit   Medication Sig Dispense Refill    escitalopram (LEXAPRO) 5 MG tablet TAKE TWO TABLETS BY MOUTH DAILY 60 tablet 3    fluticasone (FLONASE) 50 MCG/ACT nasal spray 1 spray by Each Nostril route daily       No current facility-administered medications on file prior to visit. Allergies   Allergen Reactions    Amoxicillin     Norvasc [Amlodipine]        OBJECTIVE:  BP (!) 202/90   Pulse 58   Temp 98.1 °F (36.7 °C)   Ht 4' 11\" (1.499 m)   Wt 117 lb (53.1 kg)   SpO2 98%   BMI 23.63 kg/m²   BP Readings from Last 2 Encounters:   12/13/22 (!) 202/90   06/07/22 (!) 225/91     Wt Readings from Last 3 Encounters:   12/13/22 117 lb (53.1 kg)   06/07/22 113 lb 6.4 oz (51.4 kg)   03/04/22 115 lb (52.2 kg)       Physical Exam  Vitals reviewed. Constitutional:       General: She is not in acute distress. Appearance: Normal appearance. She is not ill-appearing. HENT:      Head: Normocephalic and atraumatic. Right Ear: External ear normal.      Left Ear: External ear normal.      Nose: Nose normal. No congestion. Mouth/Throat:      Mouth: Mucous membranes are moist.      Pharynx: Oropharynx is clear. No oropharyngeal exudate. Eyes:      Extraocular Movements: Extraocular movements intact. Conjunctiva/sclera: Conjunctivae normal.      Pupils: Pupils are equal, round, and reactive to light. Cardiovascular:      Rate and Rhythm: Normal rate and regular rhythm. Pulses: Normal pulses. Heart sounds: S1 normal and S2 normal. Murmur heard. Systolic murmur is present with a grade of 2/6. Pulmonary:      Effort: Pulmonary effort is normal. No respiratory distress. Breath sounds: Normal breath sounds. No stridor. No wheezing, rhonchi or rales. Abdominal:      General: Bowel sounds are normal.      Palpations: Abdomen is soft. Tenderness: There is no abdominal tenderness. Musculoskeletal:         General: Normal range of motion. Cervical back: Normal range of motion and neck supple. Right lower leg: No edema. Left lower leg: No edema. Skin:     General: Skin is warm. Capillary Refill: Capillary refill takes less than 2 seconds. Findings: No erythema or rash. Neurological:      General: No focal deficit present. Mental Status: She is alert and oriented to person, place, and time. Mental status is at baseline. Motor: No weakness. Coordination: Coordination normal.      Gait: Gait normal.   Psychiatric:         Mood and Affect: Mood normal.         Behavior: Behavior normal.         Thought Content:  Thought content normal.         Judgment: Judgment normal.       Lab Results   Component Value Date    WBC 6.7 03/23/2021    HGB 14.5 03/23/2021    HCT 43.0 03/23/2021    MCV 90.3 03/23/2021     03/23/2021     Lab Results   Component Value Date     01/04/2022    K 5.0 01/04/2022     01/04/2022    CO2 26 01/04/2022    BUN 19 01/04/2022    CREATININE 0.8 01/04/2022    GLUCOSE 100 (H) 01/04/2022    CALCIUM 9.5 01/04/2022    PROT 7.5 01/04/2022 LABALBU 4.5 01/04/2022    BILITOT 0.3 01/04/2022    ALKPHOS 97 01/04/2022    AST 19 01/04/2022    ALT 13 01/04/2022    LABGLOM >60 01/04/2022    GFRAA >60 01/04/2022    AGRATIO 1.5 01/04/2022    GLOB 3.0 06/18/2021     Lab Results   Component Value Date    CHOL 239 (H) 06/07/2022    CHOL 225 (H) 01/04/2022    CHOL 218 (H) 03/23/2021     Lab Results   Component Value Date    TRIG 66 06/07/2022    TRIG 78 01/04/2022    TRIG 78 03/23/2021     Lab Results   Component Value Date    HDL 51 06/07/2022    HDL 53 01/04/2022    HDL 51 03/23/2021     Lab Results   Component Value Date    LDLCALC 175 (H) 06/07/2022    LDLCALC 156 (H) 01/04/2022    LDLCALC 151 (H) 03/23/2021     Lab Results   Component Value Date    LABVLDL 13 06/07/2022    LABVLDL 16 01/04/2022    LABVLDL 16 03/23/2021     Lab Results   Component Value Date    LABA1C 6.2 06/07/2022         Discussed use, benefit, and side effects of prescribed medications. Barriers to medication compliance addressed. All patient questions answered. Pt voiced understanding. RTC No follow-ups on file. No future appointments.       Romayne Amel, MD  12/13/2022  8:20 AM

## 2022-12-14 LAB
ESTIMATED AVERAGE GLUCOSE: 125.5 MG/DL
HBA1C MFR BLD: 6 %

## 2022-12-15 ENCOUNTER — TELEPHONE (OUTPATIENT)
Dept: PRIMARY CARE CLINIC | Age: 72
End: 2022-12-15

## 2022-12-15 DIAGNOSIS — E03.9 ACQUIRED HYPOTHYROIDISM: Primary | ICD-10-CM

## 2022-12-15 RX ORDER — LEVOTHYROXINE SODIUM 0.05 MG/1
50 TABLET ORAL DAILY
Qty: 30 TABLET | Refills: 0 | Status: SHIPPED | OUTPATIENT
Start: 2022-12-15

## 2022-12-16 ENCOUNTER — PATIENT MESSAGE (OUTPATIENT)
Dept: PRIMARY CARE CLINIC | Age: 72
End: 2022-12-16

## 2022-12-16 DIAGNOSIS — E03.9 ACQUIRED HYPOTHYROIDISM: ICD-10-CM

## 2022-12-19 RX ORDER — LEVOTHYROXINE SODIUM 0.05 MG/1
50 TABLET ORAL DAILY
Qty: 30 TABLET | Refills: 0 | Status: SHIPPED | OUTPATIENT
Start: 2022-12-19

## 2022-12-19 NOTE — TELEPHONE ENCOUNTER
From: Ottoniel Le  To: Dr. Mercedez Solis: 12/16/2022 12:07 PM EST  Subject: Wrong Pharmacy    Dr. Irving Gage,  Once again the prescription that was called in on Tuesday went to [facility]. This is the third or fourth time that I have requested all prescriptions be sent to:  [facility] [place], [address]    Also, I had a call left on my voice mail to call the office at the [address] number. I have called this number about 4-5 and the phone rang but no one answered. Someone did reach me by calling when I was home but this whole experience has been frustrating. Please recall my latest prescription into [facility] listed above and not [facility]. I do not understand why ordering from my perferred pharmacy is such a problem. I do not think that I can make it any clearer.      Thank you,  Ottoniel Le

## 2023-01-04 ENCOUNTER — PATIENT MESSAGE (OUTPATIENT)
Dept: PRIMARY CARE CLINIC | Age: 73
End: 2023-01-04

## 2023-01-04 NOTE — TELEPHONE ENCOUNTER
From: Ana Maria Le  To: Dr. Radhika Valentin: 1/4/2023 12:22 AM EST  Subject: Question re new medication    Please let me know why Dr. Belinda Monet. prescribed Levothyrixine for me. I know that it has something to do with my thyroid gland. Was there a result from my recent blood work that resulted in this script? If so, please let me know the specific test result. I am soon to be 75years old and of sound mind, in the future I want to discuss any new medication that is being considered before a script is written.      Thank you,  Ana Maria Le

## 2023-01-16 DIAGNOSIS — E03.9 ACQUIRED HYPOTHYROIDISM: ICD-10-CM

## 2023-01-16 RX ORDER — LEVOTHYROXINE SODIUM 0.05 MG/1
TABLET ORAL
Qty: 30 TABLET | Refills: 0 | Status: SHIPPED | OUTPATIENT
Start: 2023-01-16

## 2023-01-16 NOTE — TELEPHONE ENCOUNTER
Medication:   Requested Prescriptions     Pending Prescriptions Disp Refills    levothyroxine (SYNTHROID) 50 MCG tablet [Pharmacy Med Name: LEVOTHYROXINE 50 MCG TABLET] 30 tablet 0     Sig: TAKE ONE TABLET BY MOUTH DAILY        Last Filled:  12/19/2022    Patient Phone Number: [phone] (home)     Last appt: 12/13/2022   Next appt: 6/13/2023    Last OARRS: No flowsheet data found.

## 2023-01-25 ENCOUNTER — PATIENT MESSAGE (OUTPATIENT)
Dept: PRIMARY CARE CLINIC | Age: 73
End: 2023-01-25

## 2023-01-26 ENCOUNTER — TELEPHONE (OUTPATIENT)
Dept: PRIMARY CARE CLINIC | Age: 73
End: 2023-01-26

## 2023-01-26 NOTE — TELEPHONE ENCOUNTER
Pt call in and tested positive for Covid on 1/25/23. And states that she does not feel bad no fever no real symptoms just congested and would like to know what she could do other then resting and plenty of liquids.

## 2023-02-13 DIAGNOSIS — E03.9 ACQUIRED HYPOTHYROIDISM: ICD-10-CM

## 2023-02-13 RX ORDER — LEVOTHYROXINE SODIUM 0.05 MG/1
TABLET ORAL
Qty: 30 TABLET | Refills: 0 | Status: SHIPPED | OUTPATIENT
Start: 2023-02-13

## 2023-02-28 ENCOUNTER — TELEPHONE (OUTPATIENT)
Dept: PRIMARY CARE CLINIC | Age: 73
End: 2023-02-28

## 2023-02-28 NOTE — TELEPHONE ENCOUNTER
[facility] with [employer] called in to let us know Pt was given losartan 25 mg instead of her levothyroxine 50. She had taken this medication 10 days before it was seen. Pt spoke with pharmacist and verified no side affects or reactions to med. Pharmacy just wanted us to know cause this may affect pts TSH.

## 2023-03-07 ENCOUNTER — HOSPITAL ENCOUNTER (OUTPATIENT)
Dept: WOMENS IMAGING | Age: 73
Discharge: HOME | End: 2023-03-07
Payer: MEDICARE

## 2023-03-07 DIAGNOSIS — Z12.31 ENCOUNTER FOR SCREENING MAMMOGRAM FOR BREAST CANCER: ICD-10-CM

## 2023-03-07 PROCEDURE — 77067 SCR MAMMO BI INCL CAD: CPT

## 2023-04-05 DIAGNOSIS — E03.9 ACQUIRED HYPOTHYROIDISM: ICD-10-CM

## 2023-04-05 RX ORDER — LEVOTHYROXINE SODIUM 0.05 MG/1
50 TABLET ORAL DAILY
Qty: 30 TABLET | Refills: 0 | Status: SHIPPED | OUTPATIENT
Start: 2023-04-05

## 2023-04-05 RX ORDER — LEVOTHYROXINE SODIUM 0.05 MG/1
TABLET ORAL
Qty: 30 TABLET | Refills: 0 | OUTPATIENT
Start: 2023-04-05

## 2023-04-05 NOTE — TELEPHONE ENCOUNTER
Medication:   Requested Prescriptions     Pending Prescriptions Disp Refills    levothyroxine (SYNTHROID) 50 MCG tablet [Pharmacy Med Name: LEVOTHYROXINE 50 MCG TABLET] 30 tablet 0     Sig: TAKE ONE TABLET BY MOUTH DAILY        Last Filled:      Patient Phone Number: [phone] (home)     Last appt: 12/13/2022   Next appt: 6/13/2023    Last OARRS: No flowsheet data found.

## 2023-04-05 NOTE — TELEPHONE ENCOUNTER
levothyroxine (SYNTHROID) 50 MCG tablet     [facility] [id number] [facility] [address] [address]   Phone:  [phone]  Fax:  [phone]

## 2023-05-05 DIAGNOSIS — E03.9 ACQUIRED HYPOTHYROIDISM: ICD-10-CM

## 2023-05-05 RX ORDER — LEVOTHYROXINE SODIUM 0.05 MG/1
50 TABLET ORAL DAILY
Qty: 30 TABLET | Refills: 5 | Status: SHIPPED | OUTPATIENT
Start: 2023-05-05

## 2023-05-05 NOTE — TELEPHONE ENCOUNTER
levothyroxine (SYNTHROID) 50 MCG tablet   [facility] [id number] [address] [facility] - [facility] [phone]    Pt said she has always only came in every six months she started seeing provider not every 3 months. Call pt when sent or if provider denies.

## 2023-05-05 NOTE — TELEPHONE ENCOUNTER
Medication:   Requested Prescriptions     Pending Prescriptions Disp Refills    levothyroxine (SYNTHROID) 50 MCG tablet 30 tablet 0     Sig: Take 1 tablet by mouth daily        Last Filled:  4/5/23    Patient Phone Number: [phone] (home)     Last appt: 12/13/2022   Next appt: 6/13/2023    Last OARRS: No flowsheet data found.

## 2023-06-13 DIAGNOSIS — R73.03 PREDIABETES: ICD-10-CM

## 2023-06-13 DIAGNOSIS — E03.9 ACQUIRED HYPOTHYROIDISM: ICD-10-CM

## 2023-06-13 DIAGNOSIS — I10 RESISTANT HYPERTENSION: ICD-10-CM

## 2023-06-13 DIAGNOSIS — E78.2 MIXED HYPERLIPIDEMIA: ICD-10-CM

## 2023-06-14 LAB
ALBUMIN SERPL-MCNC: 4.3 G/DL (ref 3.4–5)
ALBUMIN/GLOB SERPL: 1.3 {RATIO} (ref 1.1–2.2)
ALP SERPL-CCNC: 105 U/L (ref 40–129)
ALT SERPL-CCNC: 11 U/L (ref 10–40)
ANION GAP SERPL CALCULATED.3IONS-SCNC: 13 MMOL/L (ref 3–16)
AST SERPL-CCNC: 18 U/L (ref 15–37)
BILIRUB SERPL-MCNC: 0.4 MG/DL (ref 0–1)
BUN SERPL-MCNC: 16 MG/DL (ref 7–20)
CALCIUM SERPL-MCNC: 9.4 MG/DL (ref 8.3–10.6)
CHLORIDE SERPL-SCNC: 102 MMOL/L (ref 99–110)
CHOLEST SERPL-MCNC: 236 MG/DL (ref 0–199)
CO2 SERPL-SCNC: 26 MMOL/L (ref 21–32)
CREAT SERPL-MCNC: 0.7 MG/DL (ref 0.6–1.2)
EST. AVERAGE GLUCOSE BLD GHB EST-MCNC: 134.1 MG/DL
GFR SERPLBLD CREATININE-BSD FMLA CKD-EPI: >60 ML/MIN/{1.73_M2}
GLUCOSE SERPL-MCNC: 111 MG/DL (ref 70–99)
HBA1C MFR BLD: 6.3 %
HDLC SERPL-MCNC: 47 MG/DL (ref 40–60)
LDLC SERPL CALC-MCNC: 167 MG/DL
POTASSIUM SERPL-SCNC: 4.4 MMOL/L (ref 3.5–5.1)
PROT SERPL-MCNC: 7.6 G/DL (ref 6.4–8.2)
SODIUM SERPL-SCNC: 141 MMOL/L (ref 136–145)
T4 FREE SERPL-MCNC: 1.1 NG/DL (ref 0.9–1.8)
TRIGL SERPL-MCNC: 108 MG/DL (ref 0–150)
TSH SERPL DL<=0.005 MIU/L-ACNC: 1.03 UIU/ML (ref 0.27–4.2)
VLDLC SERPL CALC-MCNC: 22 MG/DL

## 2023-06-28 DIAGNOSIS — I10 RESISTANT HYPERTENSION: ICD-10-CM

## 2023-06-29 RX ORDER — LISINOPRIL 10 MG/1
10 TABLET ORAL DAILY
Qty: 30 TABLET | Refills: 0 | Status: SHIPPED | OUTPATIENT
Start: 2023-06-29 | End: 2023-07-29

## 2023-07-06 ENCOUNTER — PATIENT MESSAGE (OUTPATIENT)
Dept: PRIMARY CARE CLINIC | Age: 73
End: 2023-07-06

## 2023-07-06 DIAGNOSIS — I10 RESISTANT HYPERTENSION: ICD-10-CM

## 2023-07-06 RX ORDER — LISINOPRIL 10 MG/1
30 TABLET ORAL DAILY
Qty: 90 TABLET | Refills: 1 | Status: SHIPPED | OUTPATIENT
Start: 2023-07-06 | End: 2023-08-05

## 2023-07-10 DIAGNOSIS — I10 RESISTANT HYPERTENSION: ICD-10-CM

## 2023-07-10 RX ORDER — LISINOPRIL 10 MG/1
TABLET ORAL
Qty: 30 TABLET | OUTPATIENT
Start: 2023-07-10

## 2023-07-10 NOTE — TELEPHONE ENCOUNTER
Medication:   Requested Prescriptions     Pending Prescriptions Disp Refills    lisinopril (PRINIVIL;ZESTRIL) 10 MG tablet [Pharmacy Med Name: LISINOPRIL 10 MG TABLET] 30 tablet      Sig: TAKE 1 TABLET BY MOUTH DAILY        Last Filled: 7/6/23     Last appt: 6/13/2023   Next appt: 12/14/2023    Last OARRS: No flowsheet data found.

## 2023-07-15 ENCOUNTER — PATIENT MESSAGE (OUTPATIENT)
Dept: PRIMARY CARE CLINIC | Age: 73
End: 2023-07-15

## 2023-07-15 DIAGNOSIS — I10 RESISTANT HYPERTENSION: ICD-10-CM

## 2023-07-15 DIAGNOSIS — E03.9 ACQUIRED HYPOTHYROIDISM: Primary | ICD-10-CM

## 2023-07-15 DIAGNOSIS — E78.2 MIXED HYPERLIPIDEMIA: ICD-10-CM

## 2023-08-01 DIAGNOSIS — I10 RESISTANT HYPERTENSION: ICD-10-CM

## 2023-08-01 RX ORDER — LISINOPRIL 10 MG/1
TABLET ORAL
Qty: 30 TABLET | OUTPATIENT
Start: 2023-08-01

## 2023-08-01 NOTE — TELEPHONE ENCOUNTER
Medication:   Requested Prescriptions     Pending Prescriptions Disp Refills    lisinopril (PRINIVIL;ZESTRIL) 10 MG tablet [Pharmacy Med Name: LISINOPRIL 10 MG TABLET] 30 tablet      Sig: TAKE 1 TABLET BY MOUTH DAILY        Last Filled:      Patient Phone Number: [phone] (home)     Last appt: 6/13/2023   Next appt: 12/14/2023    Last OARRS: No flowsheet data found.

## 2023-08-08 RX ORDER — LISINOPRIL 40 MG/1
40 TABLET ORAL DAILY
Qty: 90 TABLET | Refills: 1 | Status: SHIPPED | OUTPATIENT
Start: 2023-08-08

## 2023-08-11 DIAGNOSIS — E03.9 ACQUIRED HYPOTHYROIDISM: ICD-10-CM

## 2023-08-12 LAB
T4 FREE SERPL-MCNC: 0.8 NG/DL (ref 0.9–1.8)
TSH SERPL DL<=0.005 MIU/L-ACNC: 7.5 UIU/ML (ref 0.27–4.2)

## 2023-08-13 DIAGNOSIS — E03.9 ACQUIRED HYPOTHYROIDISM: Primary | ICD-10-CM

## 2023-08-21 ENCOUNTER — OFFICE VISIT (OUTPATIENT)
Dept: PRIMARY CARE CLINIC | Age: 73
End: 2023-08-21
Payer: MEDICARE

## 2023-08-21 VITALS
SYSTOLIC BLOOD PRESSURE: 220 MMHG | TEMPERATURE: 98 F | HEIGHT: 58 IN | WEIGHT: 117.8 LBS | BODY MASS INDEX: 24.73 KG/M2 | DIASTOLIC BLOOD PRESSURE: 84 MMHG | HEART RATE: 68 BPM

## 2023-08-21 DIAGNOSIS — I10 RESISTANT HYPERTENSION: Primary | ICD-10-CM

## 2023-08-21 DIAGNOSIS — J30.2 SEASONAL ALLERGIES: ICD-10-CM

## 2023-08-21 DIAGNOSIS — R05.8 OTHER COUGH: ICD-10-CM

## 2023-08-21 DIAGNOSIS — F41.9 ANXIETY: ICD-10-CM

## 2023-08-21 DIAGNOSIS — E03.9 ACQUIRED HYPOTHYROIDISM: ICD-10-CM

## 2023-08-21 DIAGNOSIS — M81.0 OSTEOPOROSIS WITHOUT CURRENT PATHOLOGICAL FRACTURE, UNSPECIFIED OSTEOPOROSIS TYPE: ICD-10-CM

## 2023-08-21 DIAGNOSIS — E78.2 MIXED HYPERLIPIDEMIA: ICD-10-CM

## 2023-08-21 PROBLEM — F33.9 MAJOR DEPRESSIVE DISORDER, RECURRENT, UNSPECIFIED (HCC): Status: ACTIVE | Noted: 2023-08-21

## 2023-08-21 PROBLEM — F33.9 MAJOR DEPRESSIVE DISORDER, RECURRENT, UNSPECIFIED (HCC): Status: RESOLVED | Noted: 2023-08-21 | Resolved: 2023-08-21

## 2023-08-21 PROCEDURE — 3077F SYST BP >= 140 MM HG: CPT | Performed by: FAMILY MEDICINE

## 2023-08-21 PROCEDURE — 3079F DIAST BP 80-89 MM HG: CPT | Performed by: FAMILY MEDICINE

## 2023-08-21 PROCEDURE — 1123F ACP DISCUSS/DSCN MKR DOCD: CPT | Performed by: FAMILY MEDICINE

## 2023-08-21 PROCEDURE — 99214 OFFICE O/P EST MOD 30 MIN: CPT | Performed by: FAMILY MEDICINE

## 2023-08-21 RX ORDER — CETIRIZINE HYDROCHLORIDE 10 MG/1
10 TABLET ORAL DAILY
Qty: 90 TABLET | Refills: 1 | Status: SHIPPED | OUTPATIENT
Start: 2023-08-21

## 2023-08-21 ASSESSMENT — ENCOUNTER SYMPTOMS
COUGH: 0
EYE PAIN: 0
ABDOMINAL PAIN: 0
CONSTIPATION: 0
SHORTNESS OF BREATH: 0
DIARRHEA: 0

## 2023-08-21 NOTE — PROGRESS NOTES
Chief Complaint   Patient presents with    Discuss Medications         ASSESSMENT/PLAN:  1. Resistant hypertension  Still uncontrolled on lisinopril 40 mg daily. She is diligent about admin but home numbers are getting higher: 200s/90s  Politely declines most antihypertensives due to \"side effects\" and understands risk involved. Reviewed last echo in 2021, normal EF  Reviewed labs   Will follow up with cardiology for eval and recommendations   Referral to nephrology placed as well, if she decides she would like a second opinion. Follow up with new PCP after cardiology appointment   - Bo Lundy MD, Cardiology, Pietro Donovan MD, Nephrology, [facility]    2. Other cough  Likely secondary to PND from allergies vs medication side effect from lisinopril   Open to trying zyrtec with flonase before switching antihypertensive. Follow up after cardiology evaluation, sooner if needed  - cetirizine (ZYRTEC) 10 MG tablet; Take 1 tablet by mouth daily  Dispense: 90 tablet; Refill: 1    3. Seasonal allergies  As above. - cetirizine (ZYRTEC) 10 MG tablet; Take 1 tablet by mouth daily  Dispense: 90 tablet; Refill: 1    4. Acquired hypothyroidism  Asymptomatic   Continue levo 50 mcg   Recheck TSH in 6 weeks- active order already in.    5. Mixed hyperlipidemia  The ASCVD Risk score (Palatka DK, et al., 2019) failed to calculate for the following reasons: The valid systolic blood pressure range is 90 to 200 mmHg  Discussed risk and benefits of stain and asa and she politely declines. 6. Anxiety  Controlled without lexapro, will DC  Follow up PRN    7. Osteoporosis  Discussed HM and offered DEXA but she politely declines.        Orders Placed This Encounter    Bo Lundy MD, Cardiology, [place]     Referral Priority:   Routine     Referral Type:   Eval and Treat     Referral Reason:   Specialty Services Required     Referred to Provider:   Yonis Holt

## 2023-08-25 ENCOUNTER — TELEPHONE (OUTPATIENT)
Dept: CARDIOLOGY CLINIC | Age: 73
End: 2023-08-25

## 2023-08-25 NOTE — TELEPHONE ENCOUNTER
Pt has new appt with us on 10.19 with KRISTEN. Pt states for past 6 months she has had bp with 550 and over systolic. Pt states she needs to be seen sooner. Pt will only go to [facility]. Please advise.

## 2023-09-11 ENCOUNTER — OFFICE VISIT (OUTPATIENT)
Dept: CARDIOLOGY CLINIC | Age: 73
End: 2023-09-11
Payer: MEDICARE

## 2023-09-11 ENCOUNTER — TELEPHONE (OUTPATIENT)
Dept: CARDIOLOGY CLINIC | Age: 73
End: 2023-09-11

## 2023-09-11 VITALS
BODY MASS INDEX: 25.19 KG/M2 | DIASTOLIC BLOOD PRESSURE: 87 MMHG | HEIGHT: 58 IN | OXYGEN SATURATION: 98 % | WEIGHT: 120 LBS | HEART RATE: 62 BPM | SYSTOLIC BLOOD PRESSURE: 207 MMHG

## 2023-09-11 DIAGNOSIS — I35.0 NONRHEUMATIC AORTIC VALVE STENOSIS: ICD-10-CM

## 2023-09-11 DIAGNOSIS — I15.9 SECONDARY HYPERTENSION: Primary | ICD-10-CM

## 2023-09-11 DIAGNOSIS — R53.83 FATIGUE, UNSPECIFIED TYPE: ICD-10-CM

## 2023-09-11 DIAGNOSIS — I10 SEVERE HYPERTENSION: ICD-10-CM

## 2023-09-11 DIAGNOSIS — E78.2 MIXED HYPERLIPIDEMIA: ICD-10-CM

## 2023-09-11 DIAGNOSIS — Z01.89 NEEDS SLEEP APNEA ASSESSMENT: ICD-10-CM

## 2023-09-11 DIAGNOSIS — Z79.899 MEDICATION MANAGEMENT: ICD-10-CM

## 2023-09-11 DIAGNOSIS — R06.02 SOB (SHORTNESS OF BREATH) ON EXERTION: ICD-10-CM

## 2023-09-11 PROCEDURE — 1123F ACP DISCUSS/DSCN MKR DOCD: CPT | Performed by: STUDENT IN AN ORGANIZED HEALTH CARE EDUCATION/TRAINING PROGRAM

## 2023-09-11 PROCEDURE — 3079F DIAST BP 80-89 MM HG: CPT | Performed by: STUDENT IN AN ORGANIZED HEALTH CARE EDUCATION/TRAINING PROGRAM

## 2023-09-11 PROCEDURE — 93000 ELECTROCARDIOGRAM COMPLETE: CPT | Performed by: STUDENT IN AN ORGANIZED HEALTH CARE EDUCATION/TRAINING PROGRAM

## 2023-09-11 PROCEDURE — 3077F SYST BP >= 140 MM HG: CPT | Performed by: STUDENT IN AN ORGANIZED HEALTH CARE EDUCATION/TRAINING PROGRAM

## 2023-09-11 PROCEDURE — 99204 OFFICE O/P NEW MOD 45 MIN: CPT | Performed by: STUDENT IN AN ORGANIZED HEALTH CARE EDUCATION/TRAINING PROGRAM

## 2023-09-11 RX ORDER — HYDROCHLOROTHIAZIDE 25 MG/1
25 TABLET ORAL EVERY MORNING
Qty: 30 TABLET | Refills: 3 | Status: SHIPPED | OUTPATIENT
Start: 2023-09-11

## 2023-09-11 NOTE — PROGRESS NOTES
CARDIOLOGY CONSULTATION        Patient Name: Scotty Le  Primary Care physician: Pb Campuzano DO    Reason for Referral/Chief Complaint: Scotty Le is a 75 y.o. patient who is referred to cardiology clinic today for evaluation and treatment of hypertension. History of Present Illness:   Roxana Barbour is a 75 y.o. female with a medical history of HTN, HLD, TIA, prediabetes, murmur. She is referred by Tab Watson MD. She previously followed with Dr. Dinora Whipple and Dr. Brooklynn Mccrary at [facility]. Echo 7/15/2021 EF 55-60%, mild aortic stenosis, Mild mitral and tricuspid regurgitation, SPAP 30. Today, 9/11/2023, ***    The patient denies chest pain, shortness of breath at rest and dyspnea with exertion. Denies palpitations, dizziness, near-syncope or chari syncope. Denies paroxysmal nocturnal dyspnea, orthopnea, bendopnea, increasing lower extremity edema or weight gain. The patient endorses highest level of activity as ***. Past Medical History:   has a past medical history of Allergic rhinitis, Anxiety, Cerebrovascular disease, Headache, Hypertension, Major depressive disorder, recurrent, unspecified, Nonspecific reaction to tuberculin skin test without active tuberculosis, Osteoarthritis, and Skin cancer. Surgical History:   has a past surgical history that includes eye surgery (2015) and Tonsillectomy (1955). Social History:   reports that she has never smoked. She has never used smokeless tobacco. She reports that she does not drink alcohol and does not use drugs. Family History:  family history includes Breast Cancer in her maternal aunt and maternal aunt; Breast Cancer (age of onset: 39) in her mother; Heart Disease in her father and mother; Ovarian Cancer in her paternal grandmother; Stroke in her mother; Substance Abuse in her brother.     Home Medications:  Were reviewed and are listed in nursing record and/or below  Prior to Admission medications    Medication Sig Start
ischemic changes. Echo: 7/15/2021   Summary   Normal left ventricle size, wall thickness and systolic function with an   estimated ejection fraction of 55-60%. No regional wall motion abnormalities are seen. Normal left ventricular diastolic filling pressure. The right ventricle is normal in size and function. Individual aortic valve leaflets are not clearly visualized but do appear   calcified. The aortic valve area is calculated at 1.24 cm2 with max velocity of2.26   m/sec, a maximum pressure gradient of 20 mmHg and a mean pressure gradient   of 12 mmHg. Findings consistent with mild aortic stenosis. MIld mitral and tricuspid regurgitation. Systolic pulmonary artery pressure (SPAP) is normal and estimated at 30 mmHg   (right atrial pressure 3 mmHg). Impression and Plan:      Hypertension, uncontrolled   Resistant vs secondary hypertension? Fatigue/daytime somnolence   Snoring   Mild Aortic Stenosis by TTE 2021    -Suspect patient may have  secondary /resistant hypertension. Her BP today 208/87 and on average her SBP has been >190 the last week. Per chart review, her BP was also elevated around 200 while on Losartan, HCTZ, Aldactone. Patient reports stopping these medications because \"they didn't help. \" Recently has been started on lisinopril 10mg, with uptitration to 40mg. Denies any chest pain. Reports back pain that is musculoskeletal; worse with movement and tender to palpation. Start hydrochlorothiazide 25 mg once daily ~ uncontrolled hypertension  EKG reviewed  Order echocardiogram ~ aortic stenosis, severe hypertension   Refer to Pulmonary for sleep apnea evaluation   Order renal arterial doppler ~ assess for stenosis of renal artery given severe hypertension  Order aldosterone and renin lab ~ hormonal reasoning to hypertension. Potassium normal on prior labs, does not rule out hyperaldosteronism. Instructed to monitor blood pressure and heart rate at home keep a log.  Call office

## 2023-09-11 NOTE — PATIENT INSTRUCTIONS
Your provider has ordered testing for further evaluation. An order/prescription has been included in your paper work. To schedule outpatient testing, contact Central Scheduling by calling [employer] ([phone]). Plan:  Start hydrochlorothiazide 25 mg once daily ~ uncontrolled hypertension  EKG reviewed  Order echocardiogram ~ aortic stenosis  ~A test that records movement of your heart valves and chambers by ultrasound. Evaluates heart valves, any chamber enlargement, abnormal openings, or any fluid in the sac surrounding the heart. Refer to Pulmonary for sleep apnea evaluation   Order renal arterial doppler ~ assess for stenosis of renal artery given severe hypertension  ~A ultrasound imaging procedure that uses high-frequency sound waves to produce images of your renal arteries. These arteries supply oxygenated blood to your kidneys. This test helps to detect blockages or narrowing of the arteries. Order aldosterone and renin lab ~ hormonal reasoning to hypertension  Instructed to monitor blood pressure and heart rate at home keep a log. Call office with log in 1 week. Instructed to proceed to emergency department if you are to experience chest pain or concerning symptoms for prompt evaluation. Recommend low sodium diet (DASH diet) and exercise routine   Follow up with primary care provider Adam Bashir, DO for back discomfort   11.  Follow up in 2-3 months

## 2023-09-11 NOTE — TELEPHONE ENCOUNTER
Patient called stating that she was scheduled for a virtual appointment with Tiburcio Gaines NP for 12/14/23. The patient has never seen anyone in out office and does not know why this appointment was scheduled.    Please call the patient back at [phone]

## 2023-09-12 ENCOUNTER — HOSPITAL ENCOUNTER (OUTPATIENT)
Age: 73
Discharge: HOME | End: 2023-09-12
Payer: MEDICARE

## 2023-09-12 DIAGNOSIS — E03.9 ACQUIRED HYPOTHYROIDISM: ICD-10-CM

## 2023-09-12 DIAGNOSIS — I15.9 SECONDARY HYPERTENSION: ICD-10-CM

## 2023-09-12 DIAGNOSIS — Z79.899 MEDICATION MANAGEMENT: ICD-10-CM

## 2023-09-12 LAB — TSH SERPL DL<=0.005 MIU/L-ACNC: 0.86 UIU/ML (ref 0.27–4.2)

## 2023-09-12 PROCEDURE — 36415 COLL VENOUS BLD VENIPUNCTURE: CPT

## 2023-09-12 PROCEDURE — 84443 ASSAY THYROID STIM HORMONE: CPT

## 2023-09-13 ENCOUNTER — TELEPHONE (OUTPATIENT)
Dept: CARDIOLOGY CLINIC | Age: 73
End: 2023-09-13

## 2023-09-13 NOTE — TELEPHONE ENCOUNTER
Pt stated that she has a renal arterial scan on 9/15. Pt was advised by her insurance to call the office to make sure it is covered by insurance or if it needs a pre authorization. Please advise.

## 2023-09-15 ENCOUNTER — HOSPITAL ENCOUNTER (OUTPATIENT)
Dept: VASCULAR LAB | Age: 73
Discharge: HOME | End: 2023-09-15
Payer: MEDICARE

## 2023-09-15 DIAGNOSIS — I10 SEVERE HYPERTENSION: ICD-10-CM

## 2023-09-15 DIAGNOSIS — I15.9 SECONDARY HYPERTENSION: ICD-10-CM

## 2023-09-15 PROCEDURE — 93975 VASCULAR STUDY: CPT

## 2023-09-18 ENCOUNTER — TELEPHONE (OUTPATIENT)
Dept: CARDIOLOGY CLINIC | Age: 73
End: 2023-09-18

## 2023-09-18 DIAGNOSIS — I15.9 SECONDARY HYPERTENSION: Primary | ICD-10-CM

## 2023-09-18 RX ORDER — AMLODIPINE BESYLATE 10 MG/1
10 TABLET ORAL DAILY
Qty: 90 TABLET | Refills: 3 | Status: SHIPPED | OUTPATIENT
Start: 2023-09-18 | End: 2024-09-12

## 2023-09-18 NOTE — PROGRESS NOTES
Called patient to discuss BP log and renal artery duplex. Still with significantly uncontrolled -521R mmHg systolic, this has been ongoing for years. We are awaiting renin/aldosterone. Renal artery duplex without significant renal artery stenosis. Discussed adding amlodipine, it was listed as an allergy but per nephrology note \"nervousness, constipation? \" No true allergy per patient. Will send rx for amlodipine 10mg daily and patient will assess tolerance. Will await BMP results and patient to continue to keep BP log. If BP continues to be elevated and renal function is okay, will increase HCTZ to 50mg daily as she was prescribed by nephrology in 2021. We also discussed possible referral to a resistant hypertension clinic at some point if we continue to have issues controlling BP. Pt verbalized understanding, all questions answered.

## 2023-09-20 ENCOUNTER — HOSPITAL ENCOUNTER (OUTPATIENT)
Age: 73
Discharge: HOME | End: 2023-09-20
Payer: MEDICARE

## 2023-09-20 DIAGNOSIS — I15.9 SECONDARY HYPERTENSION: ICD-10-CM

## 2023-09-20 LAB
ANION GAP SERPL CALCULATED.3IONS-SCNC: 12 MMOL/L (ref 3–16)
BUN SERPL-MCNC: 19 MG/DL (ref 7–20)
CALCIUM SERPL-MCNC: 9.7 MG/DL (ref 8.3–10.6)
CHLORIDE SERPL-SCNC: 92 MMOL/L (ref 99–110)
CO2 SERPL-SCNC: 26 MMOL/L (ref 21–32)
CREAT SERPL-MCNC: 0.9 MG/DL (ref 0.6–1.2)
GFR SERPLBLD CREATININE-BSD FMLA CKD-EPI: >60 ML/MIN/{1.73_M2}
GLUCOSE SERPL-MCNC: 133 MG/DL (ref 70–99)
POTASSIUM SERPL-SCNC: 4.2 MMOL/L (ref 3.5–5.1)
SODIUM SERPL-SCNC: 130 MMOL/L (ref 136–145)

## 2023-09-20 PROCEDURE — 36415 COLL VENOUS BLD VENIPUNCTURE: CPT

## 2023-09-20 PROCEDURE — 80048 BASIC METABOLIC PNL TOTAL CA: CPT

## 2023-09-25 ENCOUNTER — TELEPHONE (OUTPATIENT)
Dept: CARDIOLOGY CLINIC | Age: 73
End: 2023-09-25

## 2023-09-25 DIAGNOSIS — R06.02 SOB (SHORTNESS OF BREATH) ON EXERTION: ICD-10-CM

## 2023-09-25 DIAGNOSIS — R01.1 MURMUR, CARDIAC: Primary | ICD-10-CM

## 2023-09-25 DIAGNOSIS — I35.0 NONRHEUMATIC AORTIC VALVE STENOSIS: ICD-10-CM

## 2023-09-25 DIAGNOSIS — I10 PRIMARY HYPERTENSION: ICD-10-CM

## 2023-09-25 NOTE — TELEPHONE ENCOUNTER
----- Message from Steven Banuelos MD sent at 9/25/2023  8:30 AM EDT -----  Please notify patient that their lab results show sodium is low  May be due to the new water pill  Stop HCTZ  Repeat BMP in 10 days

## 2023-09-25 NOTE — TELEPHONE ENCOUNTER
Created telephone encounter. [facility] requesting a call back to the office. Lab order placed in chart.

## 2023-09-26 NOTE — TELEPHONE ENCOUNTER
Patient called back relayed Dr Janet Miguel message (covering for AMP) patient verbally understood. Will hold HCTZ and repeat BMP in 10 days.

## 2023-10-06 ENCOUNTER — HOSPITAL ENCOUNTER (OUTPATIENT)
Age: 73
Discharge: HOME | End: 2023-10-06
Payer: MEDICARE

## 2023-10-06 DIAGNOSIS — I35.0 NONRHEUMATIC AORTIC VALVE STENOSIS: ICD-10-CM

## 2023-10-06 DIAGNOSIS — R01.1 MURMUR, CARDIAC: ICD-10-CM

## 2023-10-06 DIAGNOSIS — R06.02 SOB (SHORTNESS OF BREATH) ON EXERTION: ICD-10-CM

## 2023-10-06 DIAGNOSIS — I10 PRIMARY HYPERTENSION: ICD-10-CM

## 2023-10-06 LAB
ANION GAP SERPL CALCULATED.3IONS-SCNC: 7 MMOL/L (ref 3–16)
BUN SERPL-MCNC: 21 MG/DL (ref 7–20)
CALCIUM SERPL-MCNC: 9.1 MG/DL (ref 8.3–10.6)
CHLORIDE SERPL-SCNC: 106 MMOL/L (ref 99–110)
CO2 SERPL-SCNC: 26 MMOL/L (ref 21–32)
CREAT SERPL-MCNC: 0.7 MG/DL (ref 0.6–1.2)
GFR SERPLBLD CREATININE-BSD FMLA CKD-EPI: >60 ML/MIN/{1.73_M2}
GLUCOSE SERPL-MCNC: 101 MG/DL (ref 70–99)
POTASSIUM SERPL-SCNC: 4.3 MMOL/L (ref 3.5–5.1)
SODIUM SERPL-SCNC: 139 MMOL/L (ref 136–145)

## 2023-10-06 PROCEDURE — 36415 COLL VENOUS BLD VENIPUNCTURE: CPT

## 2023-10-06 PROCEDURE — 80048 BASIC METABOLIC PNL TOTAL CA: CPT

## 2023-10-09 ENCOUNTER — TELEPHONE (OUTPATIENT)
Dept: CARDIOLOGY CLINIC | Age: 73
End: 2023-10-09

## 2023-10-09 NOTE — TELEPHONE ENCOUNTER
Pt faxed over recent blood pressures from 9/25 to 10/6 pt states \"blood pressures have improved to a great deal. I will just take them periodically going forward. I have canvelled the sleep study and echocardiogram for now. I am continuing to take the following: lisinopril 40 mg amlodipine 10 mg, and levothyroxine 50 mcg. Will see you at my November 21st appt\"      BP readings have been scanned into pt chart.      SENDING AS LORENA

## 2023-10-10 ENCOUNTER — TELEPHONE (OUTPATIENT)
Dept: CARDIOLOGY CLINIC | Age: 73
End: 2023-10-10

## 2023-10-10 NOTE — TELEPHONE ENCOUNTER
Called and spoke with pt. Pt does not want to pursue echo and sleep study at this time. She is happy with her BP results but states the lisinopril gives her a cough so she would like to see if there is an alternative.  Please advise    AMP OOT

## 2023-10-10 NOTE — TELEPHONE ENCOUNTER
Reviewed. Pt was given advice on cardiac testing but defers . May consider ARB in future if cough persists.

## 2023-10-10 NOTE — TELEPHONE ENCOUNTER
----- Message from Toshia Aldridge MD sent at 10/10/2023  8:21 AM EDT -----  Please notify patient that their lab results look ok

## 2023-11-09 DIAGNOSIS — E03.9 ACQUIRED HYPOTHYROIDISM: ICD-10-CM

## 2023-11-09 RX ORDER — LEVOTHYROXINE SODIUM 0.05 MG/1
50 TABLET ORAL DAILY
Qty: 30 TABLET | Refills: 5 | Status: SHIPPED | OUTPATIENT
Start: 2023-11-09

## 2023-11-09 NOTE — TELEPHONE ENCOUNTER
Medication:   Requested Prescriptions     Pending Prescriptions Disp Refills    levothyroxine (SYNTHROID) 50 MCG tablet 30 tablet 5     Sig: Take 1 tablet by mouth daily        Last Filled:  5/5/2023     Patient Phone Number: [phone] (home)     Last appt: 8/21/2023   Next appt: 12/14/2023    Last OARRS:        No data to display

## 2023-11-20 NOTE — PROGRESS NOTES
pressure (SPAP) is normal and estimated at 30 mmHg (right atrial pressure 3 mmHg). Impression and Plan:      Essential HTN, better controlled   Fatigue/daytime somnolence, improved  Mild Aortic Stenosis by TTE 2021  HLD     Plan   2/2 HTN workup in past per chart review with normal renin/aldosterone. Renal US with <60% stenosis. Blood pressure greatly improved with addition of 25mg HCTZ. Reports cough with lisinopril. Stop taking the Lisinopril 40 mg daily 2/2 suspected ACEI cough. Start taking Losartan (Cozaar) 50 mg daily. Make sure you wait 36 hours before starting. Okay to start Thursday 11/23/23. Order for BMP placed. Complete this order one week after starting the Losartan. Continue to monitor your blood pressure at home twice a day, morning and night. Recommend a monitor for your bicep. Keep a log. Goal 130/80 or less. Discussed echo for mild aortic stenosis about 2 years ago; patient agreeable for repeat echo after discussion today. Continue with your PCP, Miguelito Ask, DO, to manage your thyroid and have your Levothyroxine (Synthroid) filled when needed. Defer lipid management to PCP; pt reports diet change previously. Last lipid panel in June. Recommend repeat with next lipid check with PCP. Daytime somnolence/fatigue improved with improved sleep hygiene. Pt to defer ALVAREZ eval at this time. Reasonable. Will call with results of echo. Follow up with me in one year. Scribe's attestation: This note was scribed in the presence of Dr. Krystle Mojica DO, By Fallon Bobo RN. I will address the patient's cardiac risk factors and adjusted pharmacologic treatment as needed. In addition, I have reinforced the need for patient directed risk factor modification. All questions and concerns were addressed to the patient/family. Alternatives to my treatment were discussed. Thank you for allowing us to participate in the care of [facility].  Please call me with any questions

## 2023-11-21 ENCOUNTER — OFFICE VISIT (OUTPATIENT)
Dept: CARDIOLOGY CLINIC | Age: 73
End: 2023-11-21
Payer: MEDICARE

## 2023-11-21 VITALS
OXYGEN SATURATION: 99 % | WEIGHT: 122 LBS | BODY MASS INDEX: 25.61 KG/M2 | SYSTOLIC BLOOD PRESSURE: 146 MMHG | HEART RATE: 68 BPM | HEIGHT: 58 IN | DIASTOLIC BLOOD PRESSURE: 78 MMHG

## 2023-11-21 DIAGNOSIS — I10 PRIMARY HYPERTENSION: Primary | ICD-10-CM

## 2023-11-21 DIAGNOSIS — Z79.899 MEDICATION MANAGEMENT: ICD-10-CM

## 2023-11-21 PROCEDURE — 99213 OFFICE O/P EST LOW 20 MIN: CPT | Performed by: STUDENT IN AN ORGANIZED HEALTH CARE EDUCATION/TRAINING PROGRAM

## 2023-11-21 PROCEDURE — 3077F SYST BP >= 140 MM HG: CPT | Performed by: STUDENT IN AN ORGANIZED HEALTH CARE EDUCATION/TRAINING PROGRAM

## 2023-11-21 PROCEDURE — 1123F ACP DISCUSS/DSCN MKR DOCD: CPT | Performed by: STUDENT IN AN ORGANIZED HEALTH CARE EDUCATION/TRAINING PROGRAM

## 2023-11-21 PROCEDURE — 3078F DIAST BP <80 MM HG: CPT | Performed by: STUDENT IN AN ORGANIZED HEALTH CARE EDUCATION/TRAINING PROGRAM

## 2023-11-21 RX ORDER — LOSARTAN POTASSIUM 50 MG/1
50 TABLET ORAL DAILY
Qty: 90 TABLET | Refills: 1 | Status: SHIPPED | OUTPATIENT
Start: 2023-11-21

## 2023-11-21 NOTE — PATIENT INSTRUCTIONS
Plan   Continue to monitor your blood pressure at home twice a day, morning and night. Recommend a monitor for your bicep. Keep a log. Goal 130/80 or less. Start taking Losartan (Cozaar) 50 mg daily. Make sure you wait 36 hours before starting. Okay to start Friday 11/24/23 if you would like. Losartan (Cozaar) is an Angiotensin receptor blockers (ARBs), also known as angiotensin II receptor antagonists, are used to treat high blood pressure and heart failure. Order for BMP placed. Complete this order one week after starting the Losartan. Continue with your PCP, Leopold Flake, DO, to manage your thyroid and have your Levothyroxine (Synthroid) filled when needed. Recommend you schedule the echocardiogram to evaluate your heart murmer. The echocardiogram is an ultrasound of your heart to evaluate heart function, structures and valves.

## 2023-11-30 ENCOUNTER — HOSPITAL ENCOUNTER (OUTPATIENT)
Age: 73
Discharge: HOME | End: 2023-11-30
Payer: MEDICARE

## 2023-11-30 DIAGNOSIS — Z79.899 MEDICATION MANAGEMENT: ICD-10-CM

## 2023-11-30 LAB
ANION GAP SERPL CALCULATED.3IONS-SCNC: 10 MMOL/L (ref 3–16)
BUN SERPL-MCNC: 21 MG/DL (ref 7–20)
CALCIUM SERPL-MCNC: 9.3 MG/DL (ref 8.3–10.6)
CHLORIDE SERPL-SCNC: 106 MMOL/L (ref 99–110)
CO2 SERPL-SCNC: 25 MMOL/L (ref 21–32)
CREAT SERPL-MCNC: 0.8 MG/DL (ref 0.6–1.2)
GFR SERPLBLD CREATININE-BSD FMLA CKD-EPI: >60 ML/MIN/{1.73_M2}
GLUCOSE SERPL-MCNC: 131 MG/DL (ref 70–99)
POTASSIUM SERPL-SCNC: 4.3 MMOL/L (ref 3.5–5.1)
SODIUM SERPL-SCNC: 141 MMOL/L (ref 136–145)

## 2023-11-30 PROCEDURE — 36415 COLL VENOUS BLD VENIPUNCTURE: CPT

## 2023-11-30 PROCEDURE — 80048 BASIC METABOLIC PNL TOTAL CA: CPT

## 2023-12-01 ENCOUNTER — TELEPHONE (OUTPATIENT)
Dept: CARDIOLOGY CLINIC | Age: 73
End: 2023-12-01

## 2023-12-01 NOTE — TELEPHONE ENCOUNTER
LVM with normal result notes per HIPAA.     ----- Message from John Marquez MD sent at 12/1/2023  3:31 PM EST -----  Recently changed from lisinopril to losartan. Labs appear stable. Slightly elevated glucose. Otherwise normal.  Appears tolerating new medication. Call with any concerns. Follow-up as prescribed.

## 2023-12-07 ENCOUNTER — TELEPHONE (OUTPATIENT)
Dept: CARDIOLOGY CLINIC | Age: 73
End: 2023-12-07

## 2023-12-07 DIAGNOSIS — I10 PRIMARY HYPERTENSION: Primary | ICD-10-CM

## 2023-12-07 RX ORDER — VALSARTAN 160 MG/1
160 TABLET ORAL DAILY
Qty: 90 TABLET | Refills: 1 | Status: SHIPPED | OUTPATIENT
Start: 2023-12-07

## 2023-12-07 NOTE — TELEPHONE ENCOUNTER
----- Message from Jessica Hinojosa DO sent at 12/7/2023  1:24 PM EST -----  Regarding: RE: Losartan  Contact: [phone]  Please call patient. Lets switch to Valsartan 160mg daily. Stop Losartan. Maybe she will have better effect with a different ARB. We can't use lisinopril due to cough associated with ACEI. Thanks. AMP  ----- Message -----  From: Cyrilla Councilman, MA  Sent: 12/7/2023   8:19 AM EST  To: Jessica Hinojosa DO  Subject: Losartan                                         ----- Message from Cyrilla Councilman, [facility] sent at 12/7/2023  8:19 AM EST -----       ----- Message from [facility], Mortimer Mosses to Jessica Hinojosa DO sent at 12/7/2023  7:55 AM -----   Dr. Tyshawn La,  I have been taking Losartan 100mg along with the amlodipine 10mg for about a week now as Dr. Oneil Marin suggested. It has not improved my systolic numbers. My numbers have been between 145-180. It was 180 this am.   Should I go back to the Lisinopril 40mg along with the amlodipine? Or do you  have another suggestion? Thank you,  Migue Servin iMmi  [phone]      ----- Message -----       From:Anais Le       Sent:11/30/2023  8:41 PM EST         To:Patient Medical Advice Request Message List    Subject:Losartan    Okay I will try the 100mg Losartan daily. Thank you      ----- Message -----       From:Dr. Lucia Fox       Sent:11/30/2023  5:09 PM EST         To:Anais Le    Subject:Losartan    I am covering for Dr Tyshawn La who is on Vacation. You could try 100mg Losartan daily see if that works otherwise we will have to try different drug. Amlodipine alone may not be enough.      ----- Message -----       From:Anais Le       Sent:11/30/2023 11:33 AM EST         To:Dr. Linnea La,  My systolic numbers have gotten worse since I started taking the Losartan last Friday.   With the Lisinopril and Amlodipine they were in the 125-145 (mostly 130's) range but with the Larsartan and Amlodipine

## 2023-12-07 NOTE — TELEPHONE ENCOUNTER
Spoke with the patient. She agreed to the medication change. Losartan discontinued in med list. New Rx for Valsartan 160 mg daily sent to preferred pharmacy.

## 2023-12-12 SDOH — HEALTH STABILITY: PHYSICAL HEALTH: ON AVERAGE, HOW MANY DAYS PER WEEK DO YOU ENGAGE IN MODERATE TO STRENUOUS EXERCISE (LIKE A BRISK WALK)?: 5 DAYS

## 2023-12-12 SDOH — HEALTH STABILITY: PHYSICAL HEALTH: ON AVERAGE, HOW MANY MINUTES DO YOU ENGAGE IN EXERCISE AT THIS LEVEL?: 30 MIN

## 2023-12-12 ASSESSMENT — PATIENT HEALTH QUESTIONNAIRE - PHQ9
SUM OF ALL RESPONSES TO PHQ QUESTIONS 1-9: 0
SUM OF ALL RESPONSES TO PHQ9 QUESTIONS 1 & 2: 0
1. LITTLE INTEREST OR PLEASURE IN DOING THINGS: 0
SUM OF ALL RESPONSES TO PHQ QUESTIONS 1-9: 0
2. FEELING DOWN, DEPRESSED OR HOPELESS: 0

## 2023-12-12 ASSESSMENT — LIFESTYLE VARIABLES
HOW OFTEN DO YOU HAVE A DRINK CONTAINING ALCOHOL: NEVER
HOW OFTEN DO YOU HAVE A DRINK CONTAINING ALCOHOL: 1
HOW MANY STANDARD DRINKS CONTAINING ALCOHOL DO YOU HAVE ON A TYPICAL DAY: 0
HOW OFTEN DO YOU HAVE SIX OR MORE DRINKS ON ONE OCCASION: 1
HOW MANY STANDARD DRINKS CONTAINING ALCOHOL DO YOU HAVE ON A TYPICAL DAY: PATIENT DOES NOT DRINK

## 2023-12-14 ENCOUNTER — OFFICE VISIT (OUTPATIENT)
Dept: PRIMARY CARE CLINIC | Age: 73
End: 2023-12-14
Payer: MEDICARE

## 2023-12-14 VITALS
OXYGEN SATURATION: 98 % | SYSTOLIC BLOOD PRESSURE: 138 MMHG | TEMPERATURE: 97.5 F | BODY MASS INDEX: 24.11 KG/M2 | HEART RATE: 70 BPM | WEIGHT: 119.6 LBS | HEIGHT: 59 IN | DIASTOLIC BLOOD PRESSURE: 63 MMHG

## 2023-12-14 DIAGNOSIS — M81.0 OSTEOPOROSIS WITHOUT CURRENT PATHOLOGICAL FRACTURE, UNSPECIFIED OSTEOPOROSIS TYPE: ICD-10-CM

## 2023-12-14 DIAGNOSIS — I10 PRIMARY HYPERTENSION: ICD-10-CM

## 2023-12-14 DIAGNOSIS — E03.9 HYPOTHYROIDISM, UNSPECIFIED TYPE: ICD-10-CM

## 2023-12-14 DIAGNOSIS — Z00.00 MEDICARE ANNUAL WELLNESS VISIT, SUBSEQUENT: Primary | ICD-10-CM

## 2023-12-14 DIAGNOSIS — R73.03 PREDIABETES: ICD-10-CM

## 2023-12-14 PROBLEM — R06.02 SOB (SHORTNESS OF BREATH) ON EXERTION: Status: RESOLVED | Noted: 2023-09-11 | Resolved: 2023-12-14

## 2023-12-14 PROBLEM — L82.1 SEBORRHEIC KERATOSES: Status: RESOLVED | Noted: 2017-03-07 | Resolved: 2023-12-14

## 2023-12-14 PROBLEM — R53.83 FATIGUE: Status: RESOLVED | Noted: 2023-09-11 | Resolved: 2023-12-14

## 2023-12-14 LAB
CHOLEST SERPL-MCNC: 226 MG/DL (ref 0–199)
EST. AVERAGE GLUCOSE BLD GHB EST-MCNC: 145.6 MG/DL
HBA1C MFR BLD: 6.7 %
HDLC SERPL-MCNC: 49 MG/DL (ref 40–60)
LDLC SERPL CALC-MCNC: 158 MG/DL
TRIGL SERPL-MCNC: 97 MG/DL (ref 0–150)
TSH SERPL DL<=0.005 MIU/L-ACNC: 1.33 UIU/ML (ref 0.27–4.2)
VLDLC SERPL CALC-MCNC: 19 MG/DL

## 2023-12-14 PROCEDURE — G0439 PPPS, SUBSEQ VISIT: HCPCS | Performed by: STUDENT IN AN ORGANIZED HEALTH CARE EDUCATION/TRAINING PROGRAM

## 2023-12-14 PROCEDURE — 3078F DIAST BP <80 MM HG: CPT | Performed by: STUDENT IN AN ORGANIZED HEALTH CARE EDUCATION/TRAINING PROGRAM

## 2023-12-14 PROCEDURE — 1123F ACP DISCUSS/DSCN MKR DOCD: CPT | Performed by: STUDENT IN AN ORGANIZED HEALTH CARE EDUCATION/TRAINING PROGRAM

## 2023-12-14 PROCEDURE — 3075F SYST BP GE 130 - 139MM HG: CPT | Performed by: STUDENT IN AN ORGANIZED HEALTH CARE EDUCATION/TRAINING PROGRAM

## 2023-12-14 NOTE — PATIENT INSTRUCTIONS
Please call [phone] to schedule your DEXA bone density scan       Advance Directives: Care Instructions  Overview  An advance directive is a legal way to state your wishes at the end of your life. It tells your family and your doctor what to do if you can't say what you want. There are two main types of advance directives. You can change them any time your wishes change. Living will. This form tells your family and your doctor your wishes about life support and other treatment. The form is also called a declaration. Medical power of . This form lets you name a person to make treatment decisions for you when you can't speak for yourself. This person is called a health care agent (health care proxy, health care surrogate). The form is also called a durable power of  for health care. If you do not have an advance directive, decisions about your medical care may be made by a family member, or by a doctor or a  who doesn't know you. It may help to think of an advance directive as a gift to the people who care for you. If you have one, they won't have to make tough decisions by themselves. For more information, including forms for your state, see the [address] [place] [address] website (www.caringinfo.org/planning/advance-directives/). Follow-up care is a key part of your treatment and safety. Be sure to make and go to all appointments, and call your doctor if you are having problems. It's also a good idea to know your test results and keep a list of the medicines you take. What should you include in an advance directive? Many states have a unique advance directive form. (It may ask you to address specific issues.) Or you might use a universal form that's approved by many states. If your form doesn't tell you what to address, it may be hard to know what to include in your advance directive. Use the questions below to help you get started.   Who do you want to make decisions about your medical care if

## 2024-01-16 ENCOUNTER — HOSPITAL ENCOUNTER (OUTPATIENT)
Age: 74
Discharge: HOME | End: 2024-01-16
Payer: MEDICARE

## 2024-01-16 LAB
ANION GAP SERPL CALCULATED.3IONS-SCNC: 11 MMOL/L (ref 3–16)
BUN SERPL-MCNC: 21 MG/DL (ref 7–20)
CALCIUM SERPL-MCNC: 9.5 MG/DL (ref 8.3–10.6)
CHLORIDE SERPL-SCNC: 95 MMOL/L (ref 99–110)
CO2 SERPL-SCNC: 30 MMOL/L (ref 21–32)
CREAT SERPL-MCNC: 0.9 MG/DL (ref 0.6–1.2)
GFR SERPLBLD CREATININE-BSD FMLA CKD-EPI: >60 ML/MIN/{1.73_M2}
GLUCOSE SERPL-MCNC: 135 MG/DL (ref 70–99)
POTASSIUM SERPL-SCNC: 3 MMOL/L (ref 3.5–5.1)
SODIUM SERPL-SCNC: 136 MMOL/L (ref 136–145)

## 2024-01-16 PROCEDURE — 80048 BASIC METABOLIC PNL TOTAL CA: CPT

## 2024-01-16 PROCEDURE — 36415 COLL VENOUS BLD VENIPUNCTURE: CPT

## 2024-01-17 ENCOUNTER — TELEPHONE (OUTPATIENT)
Dept: CARDIOLOGY CLINIC | Age: 74
End: 2024-01-17

## 2024-01-17 DIAGNOSIS — I10 PRIMARY HYPERTENSION: ICD-10-CM

## 2024-01-17 DIAGNOSIS — Z79.899 MEDICATION MANAGEMENT: Primary | ICD-10-CM

## 2024-01-17 NOTE — TELEPHONE ENCOUNTER
----- Message from Kei Vazquez MD sent at 1/17/2024 12:16 PM EST -----  Please notify patient that their lab results show low K. Prob due to the chlorthaladone  Start KCL 20 meq daily - take 2 tabs the first day, am and pm. Then just one tab in am  Repeat BMP in 7-10 days

## 2024-01-17 NOTE — TELEPHONE ENCOUNTER
Called the patient to relay results message from [facility]. Patient does not wish to take an additional med to adjust a side effect from another. She is wanting to know what she can take in place of the Chlorthalidone? She also states she is no longer taking Valsartan because she noticed increased swelling in her lower extremities.     Dr. Reilly please advise.

## 2024-01-18 RX ORDER — LOSARTAN POTASSIUM 50 MG/1
50 TABLET ORAL DAILY
Qty: 90 TABLET | Refills: 1
Start: 2024-01-18

## 2024-01-18 NOTE — TELEPHONE ENCOUNTER
Pt returned call. Message given. V/u. Pt confirmed that she is taking Amlodpine 10mg 1x a day for blood pressure. She is taking Levothyroxine 50mcg for thyroid and Flonase 50mcg.

## 2024-01-18 NOTE — TELEPHONE ENCOUNTER
Per AMP. Have patient decrease norvasc to 5mg daily and re-start the losartan 50mg daily. Norvasc 10mg can cause LE edema but the 5mg should not. Explain to her that losartan should help BP but also help with \"normalizing\" potassium. We will need to repeat her BMP in one week to ensure renal function and potassium levels are normal. Thanks.     Spoke with the patient and she VU of the change. Order placed for repeat labs.

## 2024-01-18 NOTE — TELEPHONE ENCOUNTER
Attempted to reach the patient to relay message from [facility].  left with office number for patient to return office call.

## 2024-01-25 ENCOUNTER — HOSPITAL ENCOUNTER (OUTPATIENT)
Age: 74
Discharge: HOME | End: 2024-01-25
Payer: MEDICARE

## 2024-01-25 DIAGNOSIS — Z79.899 MEDICATION MANAGEMENT: ICD-10-CM

## 2024-01-25 LAB
ANION GAP SERPL CALCULATED.3IONS-SCNC: 10 MMOL/L (ref 3–16)
BUN SERPL-MCNC: 23 MG/DL (ref 7–20)
CALCIUM SERPL-MCNC: 9 MG/DL (ref 8.3–10.6)
CHLORIDE SERPL-SCNC: 103 MMOL/L (ref 99–110)
CO2 SERPL-SCNC: 27 MMOL/L (ref 21–32)
CREAT SERPL-MCNC: 0.9 MG/DL (ref 0.6–1.2)
GFR SERPLBLD CREATININE-BSD FMLA CKD-EPI: >60 ML/MIN/{1.73_M2}
GLUCOSE SERPL-MCNC: 138 MG/DL (ref 70–99)
POTASSIUM SERPL-SCNC: 4.3 MMOL/L (ref 3.5–5.1)
SODIUM SERPL-SCNC: 140 MMOL/L (ref 136–145)

## 2024-01-25 PROCEDURE — 80048 BASIC METABOLIC PNL TOTAL CA: CPT

## 2024-01-25 PROCEDURE — 36415 COLL VENOUS BLD VENIPUNCTURE: CPT

## 2024-01-26 ENCOUNTER — TELEPHONE (OUTPATIENT)
Dept: CARDIOLOGY CLINIC | Age: 74
End: 2024-01-26

## 2024-01-26 NOTE — TELEPHONE ENCOUNTER
Called and spoke with pt relaying results, pt v/u.    Pt states that she is taking her BP in the morning and in the evening and her blood pressure has been averaging around 150-155 for the systolic and 60-70 for the diastolic.She reports that she \"is not feeling like herself,\" she is fatigued and is having lower leg edema. Pt denies headache, SOB, dizziness, cp. She is taking Norvasc 5mg and chlorithalidone 12.5mg in the morning and losartan 50mg in the evening. Please advise.

## 2024-01-29 NOTE — TELEPHONE ENCOUNTER
Per AMP-Let's have her take losartan 50mg in the morning AND evening. Continue other medications as she is doing. Continue to have her check her BP and call back next week. If this is working then we will send a new Rx for the losartan so she doesn't run out. Thanks. AMP         Spoke with pt relayed AMP message. Pt v/u and she will call with BP log in one week.

## 2024-02-08 ENCOUNTER — TELEPHONE (OUTPATIENT)
Dept: CARDIOLOGY CLINIC | Age: 74
End: 2024-02-08

## 2024-02-08 DIAGNOSIS — I10 PRIMARY HYPERTENSION: ICD-10-CM

## 2024-02-08 DIAGNOSIS — I15.9 SECONDARY HYPERTENSION: ICD-10-CM

## 2024-02-08 NOTE — TELEPHONE ENCOUNTER
Dr. Reilly.     Please clarify new dose of Losartan you are wanting to increase. Patient currently takes 50 mg in the morning and 50 mg in the evening. Thank you.    \"Let's increase losartan to 100mg and she should take this in the evening. We are not controlled especially in the evenings\".

## 2024-02-08 NOTE — TELEPHONE ENCOUNTER
----- Message from Umer Reilly DO sent at 2/6/2024 10:55 AM EST -----  Regarding: RE: Follow up blood pressure results  Contact: [phone]  Let's increase losartan to 100mg and she should take this in the evening. We are not controlled especially in the evenings. I also think we should get her to the hypertension clinic. [facility] [place] [address]. We have changed multiple medications and dosages. They will be of great help. Thanks. AMP  ----- Message -----  From: Schirmer, Heidi Lee, MA  Sent: 2/6/2024  10:33 AM EST  To: Umer Reilly DO  Subject: Follow up blood pressure results                 ----- Message from Heidi Lee Schirmer, MA sent at 2/6/2024 10:33 AM EST -----       ----- Message from Anais Le to Umer Reilly DO sent at 2/6/2024  7:02 AM -----   Dr. Reilly,  As you requested, I have recorded my blood pressure for a week.   Day     AM  -  2 hrs After Med  - PM  - 2 hrs after med  Tues  132/72      145/65          146/55     155/53  Wed  136/71       114/72          121/65      --------  Thur  127/63       ---------          142/60     151/63   Fri      167/58     135/63            155/60     160/63  Sat     159/78      ---------            146/64      178/74  Sun    146/66      139/56           176/74      190/73  Mon    159/67     ---------            152/72     176/70    I am currently taking the following medications:  AM  amlopine besylate 5mg (1/2 tab of 10mg)  chlorthalidone 1/2 tab 25mg  Losartan potassium 50mg  levothyroxine 50mg    PM  Losartan potassium 50mg    I don't know if you want to make any changes in my medicine but felt that you should know I have been under a lot of extra pressure the last few weeks with two of my friends being very ill and stresses of retiring from my job.  After I get thru this month, I think my blood pressure readings will be much better.    Thank you,  Anais Le  [phone]

## 2024-02-08 NOTE — TELEPHONE ENCOUNTER
Attempted to reach the patient to relay results message from AMP.  left with office number for patient to return office call.

## 2024-02-08 NOTE — TELEPHONE ENCOUNTER
Pt called back for clarification on losartan dose and instructions. Informed her AMP will review. Please advise previous message

## 2024-02-12 ENCOUNTER — HOSPITAL ENCOUNTER (OUTPATIENT)
Dept: WOMENS IMAGING | Age: 74
Discharge: HOME | End: 2024-02-12
Payer: MEDICARE

## 2024-02-12 DIAGNOSIS — M81.0 OSTEOPOROSIS WITHOUT CURRENT PATHOLOGICAL FRACTURE, UNSPECIFIED OSTEOPOROSIS TYPE: ICD-10-CM

## 2024-02-12 PROCEDURE — 77080 DXA BONE DENSITY AXIAL: CPT

## 2024-02-12 RX ORDER — LOSARTAN POTASSIUM 50 MG/1
50 TABLET ORAL 2 TIMES DAILY
Qty: 90 TABLET | Refills: 1
Start: 2024-02-12

## 2024-02-12 RX ORDER — AMLODIPINE BESYLATE 5 MG/1
5 TABLET ORAL 2 TIMES DAILY
Qty: 180 TABLET | Refills: 1 | Status: SHIPPED | OUTPATIENT
Start: 2024-02-12

## 2024-02-12 NOTE — TELEPHONE ENCOUNTER
Pt returned call, relayed amp message for amlodipine. Pt v/u    AMP please clarify losartan instructions pt currently takes 50 mg in the morning and 50 mg in the evening   Rx pending for amlodipine

## 2024-02-12 NOTE — TELEPHONE ENCOUNTER
Attempted to reach the patient to relay message from [facility].  left with office number for patient to return office call.

## 2024-02-12 NOTE — TELEPHONE ENCOUNTER
Lets have patient take amlodipine 5mg in the morning and 5mg in the evening. Please send Norvasc (amlodipine) 5mg BID to pharmacy. Thanks AMP.

## 2024-02-12 NOTE — TELEPHONE ENCOUNTER
Spoke with the patient to clarify medications. Losartan 50 mg twice daily and Norvasc 5 mg twice daily. Medication sent to requested pharmacy.

## 2024-02-13 NOTE — TELEPHONE ENCOUNTER
Patient called back this morning to let us know that she cannot do the medication changes that were put in place yesterday.  She woke up very dizzy and has to hold onto things to get thru the house.  Her BP was 150/62 this morning.  She would like to know what to do next.

## 2024-02-15 NOTE — TELEPHONE ENCOUNTER
Referral ordered and placed into chart.    Sent patient message on EPIC giving her the fax number and phone number for the [facility].    Patient requested Mychart message with [facility] Referral phone and fax. Sent via [employer] message.    Umer Reilly, DO   to Elijah Thomas MA     2/13/24  5:34 PM  She needs to be referred to a HTN clinic. I have tried several different adjustments to no avail unfortunately. [facility] to [facility].

## 2024-04-02 NOTE — TELEPHONE ENCOUNTER
Medication:   Requested Prescriptions     Pending Prescriptions Disp Refills    escitalopram (LEXAPRO) 5 MG tablet [Pharmacy Med Name: ESCITALOPRAM 5MG TABLETS] 90 tablet 1     Sig: TAKE 2 TABLETS BY MOUTH DAILY        Last Filled:      Patient Phone Number: [phone] (home)     Last appt: 2/18/2022   Next appt: 6/7/2022    Last OARRS: No flowsheet data found. Sasha from [place] at home calling to notify Dr Calderon, patient fell over weekend and to discuss symptoms

## 2024-04-03 RX ORDER — CHLORTHALIDONE 25 MG/1
12.5 TABLET ORAL DAILY
Qty: 45 TABLET | Refills: 2 | Status: SHIPPED | OUTPATIENT
Start: 2024-04-03

## 2024-04-19 SDOH — HEALTH STABILITY: PHYSICAL HEALTH: ON AVERAGE, HOW MANY DAYS PER WEEK DO YOU ENGAGE IN MODERATE TO STRENUOUS EXERCISE (LIKE A BRISK WALK)?: 5 DAYS

## 2024-04-19 SDOH — HEALTH STABILITY: PHYSICAL HEALTH: ON AVERAGE, HOW MANY MINUTES DO YOU ENGAGE IN EXERCISE AT THIS LEVEL?: 30 MIN

## 2024-04-22 ENCOUNTER — OFFICE VISIT (OUTPATIENT)
Dept: INTERNAL MEDICINE CLINIC | Age: 74
End: 2024-04-22
Payer: MEDICARE

## 2024-04-22 VITALS
SYSTOLIC BLOOD PRESSURE: 118 MMHG | HEART RATE: 77 BPM | DIASTOLIC BLOOD PRESSURE: 70 MMHG | WEIGHT: 118 LBS | HEIGHT: 59 IN | OXYGEN SATURATION: 96 % | BODY MASS INDEX: 23.79 KG/M2

## 2024-04-22 DIAGNOSIS — E78.2 MIXED HYPERLIPIDEMIA: ICD-10-CM

## 2024-04-22 DIAGNOSIS — E03.9 BORDERLINE HYPOTHYROIDISM: ICD-10-CM

## 2024-04-22 DIAGNOSIS — Z00.00 MEDICARE ANNUAL WELLNESS VISIT, SUBSEQUENT: Primary | ICD-10-CM

## 2024-04-22 DIAGNOSIS — L84 CALLUS OF FOOT: ICD-10-CM

## 2024-04-22 DIAGNOSIS — M81.0 AGE-RELATED OSTEOPOROSIS WITHOUT CURRENT PATHOLOGICAL FRACTURE: ICD-10-CM

## 2024-04-22 DIAGNOSIS — I10 PRIMARY HYPERTENSION: ICD-10-CM

## 2024-04-22 DIAGNOSIS — Z12.11 SCREEN FOR COLON CANCER: ICD-10-CM

## 2024-04-22 PROCEDURE — 1123F ACP DISCUSS/DSCN MKR DOCD: CPT | Performed by: INTERNAL MEDICINE

## 2024-04-22 PROCEDURE — 3074F SYST BP LT 130 MM HG: CPT | Performed by: INTERNAL MEDICINE

## 2024-04-22 PROCEDURE — 3078F DIAST BP <80 MM HG: CPT | Performed by: INTERNAL MEDICINE

## 2024-04-22 PROCEDURE — G0439 PPPS, SUBSEQ VISIT: HCPCS | Performed by: INTERNAL MEDICINE

## 2024-04-22 PROCEDURE — 99204 OFFICE O/P NEW MOD 45 MIN: CPT | Performed by: INTERNAL MEDICINE

## 2024-04-22 RX ORDER — LOSARTAN POTASSIUM AND HYDROCHLOROTHIAZIDE 25; 100 MG/1; MG/1
1 TABLET ORAL DAILY
Qty: 90 TABLET | Refills: 1 | Status: SHIPPED | OUTPATIENT
Start: 2024-04-22

## 2024-04-22 RX ORDER — FLUOROURACIL 50 MG/G
1 CREAM TOPICAL 2 TIMES DAILY
COMMUNITY
Start: 2024-04-08

## 2024-04-22 ASSESSMENT — PATIENT HEALTH QUESTIONNAIRE - PHQ9
SUM OF ALL RESPONSES TO PHQ QUESTIONS 1-9: 1
SUM OF ALL RESPONSES TO PHQ9 QUESTIONS 1 & 2: 1
1. LITTLE INTEREST OR PLEASURE IN DOING THINGS: NOT AT ALL
2. FEELING DOWN, DEPRESSED OR HOPELESS: SEVERAL DAYS

## 2024-04-22 ASSESSMENT — LIFESTYLE VARIABLES
HOW OFTEN DO YOU HAVE A DRINK CONTAINING ALCOHOL: NEVER
HOW MANY STANDARD DRINKS CONTAINING ALCOHOL DO YOU HAVE ON A TYPICAL DAY: PATIENT DOES NOT DRINK

## 2024-04-22 NOTE — PROGRESS NOTES
MCG/ACT nasal spray 2 sprays by Each Nostril route daily Yes Kristin Petersen MD       [facility] (Including outside providers/suppliers regularly involved in providing care):   Patient Care Team:  Makenna Green MD as PCP - General (Internal Medicine)  Vibha Eng DO as PCP - Empaneled Provider     Reviewed and updated this visit:  Tobacco  Allergies  Meds  Problems  Med Hx  Surg Hx  Soc Hx  Fam Hx

## 2024-04-22 NOTE — PROGRESS NOTES
decline medication    Hyperlipidemia:  Patient is  following a low fat, low cholesterol diet.  She is  exercising regularly. OTC Supplements: none.  She decline medication.    The 10-year ASCVD risk score (Marybeth CARBONE, et al., 2019) is: 18.3%    Values used to calculate the score:      Age: 74 years      Sex: Female      Is Non- : No      Diabetic: No      Tobacco smoker: No      Systolic Blood Pressure: 118 mmHg      Is BP treated: Yes      HDL Cholesterol: 49 mg/dL      Total Cholesterol: 226 mg/dL       Lab Results   Component Value Date    LABA1C 6.7 12/14/2023    LABA1C 6.3 06/13/2023    LABA1C 6.0 12/13/2022     Lab Results   Component Value Date     01/25/2024    K 4.3 01/25/2024     01/25/2024    CO2 27 01/25/2024    BUN 23 (H) 01/25/2024    CREATININE 0.9 01/25/2024    GLUCOSE 138 (H) 01/25/2024    CALCIUM 9.0 01/25/2024     Lab Results   Component Value Date/Time    CHOL 226 12/14/2023 09:00 AM    TRIG 97 12/14/2023 09:00 AM    HDL 49 12/14/2023 09:00 AM    LDLCALC 158 12/14/2023 09:00 AM     Lab Results   Component Value Date    ALT 11 06/13/2023    AST 18 06/13/2023     Lab Results   Component Value Date    TSH 1.33 12/14/2023    TSH 1.03 06/13/2023    T4FREE 0.8 (L) 08/11/2023    T4FREE 1.1 06/13/2023     Lab Results   Component Value Date    WBC 7.4 12/13/2022    HGB 13.5 12/13/2022    HCT 40.2 12/13/2022    MCV 90.7 12/13/2022     12/13/2022     No results found for: \"INR\"   No results found for: \"PSA\"   No results found for: \"LABURIC\"     Patient Active Problem List   Diagnosis    Prediabetes    Osteoporosis    Hyperlipidemia    Hypertension    H/O TIA (transient ischemic attack) and stroke    Generalized osteoarthrosis, unspecified site    Nonrheumatic aortic valve stenosis    Hypothyroidism       Allergies   Allergen Reactions    Amoxicillin     Lisinopril Cough    Norvasc [Amlodipine]      Outpatient Medications Marked as Taking for the 4/22/24 encounter

## 2024-04-22 NOTE — PATIENT INSTRUCTIONS
condition or this instruction, always ask your healthcare professional. [employer]wise, [employer] disclaims any warranty or liability for your use of this information.      Personalized Preventive Plan for Anais Le - 4/22/2024  Medicare offers a range of preventive health benefits. Some of the tests and screenings are paid in full while other may be subject to a deductible, co-insurance, and/or copay.    Some of these benefits include a comprehensive review of your medical history including lifestyle, illnesses that may run in your family, and various assessments and screenings as appropriate.    After reviewing your medical record and screening and assessments performed today your provider may have ordered immunizations, labs, imaging, and/or referrals for you.  A list of these orders (if applicable) as well as your Preventive Care list are included within your After Visit Summary for your review.    Other Preventive Recommendations:    A preventive eye exam performed by an eye specialist is recommended every 1-2 years to screen for glaucoma; cataracts, macular degeneration, and other eye disorders.  A preventive dental visit is recommended every 6 months.  Try to get at least 150 minutes of exercise per week or 10,000 steps per day on a pedometer .  Order or download the FREE \"Exercise & Physical Activity: Your Everyday Guide\" from [employer]. Call 1-156.690.9121 or search [employer] online.  You need 8809-0852 mg of calcium and 9475-1775 IU of vitamin D per day. It is possible to meet your calcium requirement with diet alone, but a vitamin D supplement is usually necessary to meet this goal.  When exposed to the sun, use a sunscreen that protects against both UVA and UVB radiation with an SPF of 30 or greater. Reapply every 2 to 3 hours or after sweating, drying off with a towel, or swimming.  Always wear a seat belt when traveling in a car. Always wear a helmet

## 2024-04-24 ENCOUNTER — PROCEDURE VISIT (OUTPATIENT)
Dept: INTERNAL MEDICINE CLINIC | Age: 74
End: 2024-04-24

## 2024-04-24 VITALS
HEIGHT: 59 IN | HEART RATE: 72 BPM | BODY MASS INDEX: 24.19 KG/M2 | WEIGHT: 120 LBS | DIASTOLIC BLOOD PRESSURE: 58 MMHG | SYSTOLIC BLOOD PRESSURE: 128 MMHG | OXYGEN SATURATION: 98 %

## 2024-04-24 DIAGNOSIS — M79.671 FOOT PAIN, RIGHT: Primary | ICD-10-CM

## 2024-04-24 DIAGNOSIS — R23.8 SKIN IRRITATION: ICD-10-CM

## 2024-04-24 DIAGNOSIS — L84 CALLUS OF FOOT: ICD-10-CM

## 2024-04-24 NOTE — PROGRESS NOTES
Anais Le  YOB: 1950    Date of Service:  4/24/2024    Chief Complaint:      Chief Complaint   Patient presents with    Procedure     Right foot callus removal          Assessment/Plan:  Anais was seen today for procedure.    Diagnoses and all orders for this visit:    Foot pain, right  -     IA DESTRUCTION BENIGN LESIONS UP TO 14    Callus of foot  -     IA DESTRUCTION BENIGN LESIONS UP TO 14    Skin irritation  -     IA DESTRUCTION BENIGN LESIONS UP TO 14      Return if symptoms worsen or fail to improve.      HPI:  Anais Le is a 74 y.o.    Here to have callus shaved down due to pain right plantar.    Lab Results   Component Value Date    LABA1C 6.7 12/14/2023    LABA1C 6.3 06/13/2023    LABA1C 6.0 12/13/2022     Lab Results   Component Value Date     01/25/2024    K 4.3 01/25/2024     01/25/2024    CO2 27 01/25/2024    BUN 23 (H) 01/25/2024    CREATININE 0.9 01/25/2024    GLUCOSE 138 (H) 01/25/2024    CALCIUM 9.0 01/25/2024     Lab Results   Component Value Date/Time    CHOL 226 12/14/2023 09:00 AM    TRIG 97 12/14/2023 09:00 AM    HDL 49 12/14/2023 09:00 AM    LDLCALC 158 12/14/2023 09:00 AM     Lab Results   Component Value Date    ALT 11 06/13/2023    AST 18 06/13/2023     Lab Results   Component Value Date    TSH 1.33 12/14/2023    TSH 1.03 06/13/2023    T4FREE 0.8 (L) 08/11/2023    T4FREE 1.1 06/13/2023     Lab Results   Component Value Date    WBC 7.4 12/13/2022    HGB 13.5 12/13/2022    HCT 40.2 12/13/2022    MCV 90.7 12/13/2022     12/13/2022     No results found for: \"INR\"   No results found for: \"PSA\"   No results found for: \"LABURIC\"     Patient Active Problem List   Diagnosis    Osteoporosis    Hyperlipidemia    Primary hypertension    H/O TIA (transient ischemic attack) and stroke    Generalized osteoarthrosis, unspecified site    Nonrheumatic aortic valve stenosis    Borderline hypothyroidism       Allergies   Allergen Reactions    Amoxicillin

## 2024-04-26 ENCOUNTER — HOSPITAL ENCOUNTER (OUTPATIENT)
Dept: WOMENS IMAGING | Age: 74
Discharge: HOME | End: 2024-04-26
Payer: MEDICARE

## 2024-04-26 VITALS — BODY MASS INDEX: 24.19 KG/M2 | HEIGHT: 59 IN | WEIGHT: 120 LBS

## 2024-04-26 DIAGNOSIS — Z12.31 ENCOUNTER FOR SCREENING MAMMOGRAM FOR MALIGNANT NEOPLASM OF BREAST: ICD-10-CM

## 2024-04-26 PROCEDURE — 77063 BREAST TOMOSYNTHESIS BI: CPT

## 2024-06-22 SDOH — ECONOMIC STABILITY: HOUSING INSECURITY
IN THE LAST 12 MONTHS, WAS THERE A TIME WHEN YOU DID NOT HAVE A STEADY PLACE TO SLEEP OR SLEPT IN A SHELTER (INCLUDING NOW)?: NO

## 2024-06-22 SDOH — ECONOMIC STABILITY: FOOD INSECURITY: WITHIN THE PAST 12 MONTHS, THE FOOD YOU BOUGHT JUST DIDN'T LAST AND YOU DIDN'T HAVE MONEY TO GET MORE.: NEVER TRUE

## 2024-06-22 SDOH — ECONOMIC STABILITY: INCOME INSECURITY: HOW HARD IS IT FOR YOU TO PAY FOR THE VERY BASICS LIKE FOOD, HOUSING, MEDICAL CARE, AND HEATING?: NOT HARD AT ALL

## 2024-06-22 SDOH — ECONOMIC STABILITY: FOOD INSECURITY: WITHIN THE PAST 12 MONTHS, YOU WORRIED THAT YOUR FOOD WOULD RUN OUT BEFORE YOU GOT MONEY TO BUY MORE.: NEVER TRUE

## 2024-06-25 ENCOUNTER — OFFICE VISIT (OUTPATIENT)
Dept: INTERNAL MEDICINE CLINIC | Age: 74
End: 2024-06-25
Payer: MEDICARE

## 2024-06-25 VITALS
BODY MASS INDEX: 24.39 KG/M2 | WEIGHT: 121 LBS | HEART RATE: 73 BPM | HEIGHT: 59 IN | DIASTOLIC BLOOD PRESSURE: 44 MMHG | SYSTOLIC BLOOD PRESSURE: 120 MMHG | OXYGEN SATURATION: 97 %

## 2024-06-25 DIAGNOSIS — E03.9 BORDERLINE HYPOTHYROIDISM: ICD-10-CM

## 2024-06-25 DIAGNOSIS — E78.00 HYPERCHOLESTEREMIA: ICD-10-CM

## 2024-06-25 DIAGNOSIS — I10 PRIMARY HYPERTENSION: Primary | ICD-10-CM

## 2024-06-25 PROCEDURE — 3074F SYST BP LT 130 MM HG: CPT | Performed by: INTERNAL MEDICINE

## 2024-06-25 PROCEDURE — 1123F ACP DISCUSS/DSCN MKR DOCD: CPT | Performed by: INTERNAL MEDICINE

## 2024-06-25 PROCEDURE — 3078F DIAST BP <80 MM HG: CPT | Performed by: INTERNAL MEDICINE

## 2024-06-25 PROCEDURE — 99214 OFFICE O/P EST MOD 30 MIN: CPT | Performed by: INTERNAL MEDICINE

## 2024-06-25 RX ORDER — AMLODIPINE BESYLATE 10 MG/1
10 TABLET ORAL DAILY
Qty: 90 TABLET | Refills: 2 | Status: SHIPPED | OUTPATIENT
Start: 2024-06-25

## 2024-06-25 RX ORDER — AMLODIPINE BESYLATE 10 MG/1
10 TABLET ORAL DAILY
COMMUNITY
End: 2024-06-25 | Stop reason: SDUPTHER

## 2024-06-25 NOTE — PROGRESS NOTES
Anais Le  YOB: 1950    Date of Service:  2024    Chief Complaint:      Chief Complaint   Patient presents with    Hypertension       Assessment/Plan:  Anais was seen today for hypertension.    Diagnoses and all orders for this visit:    Primary hypertension  -     amLODIPine (NORVASC) 10 MG tablet; Take 1 tablet by mouth daily  -     Comprehensive Metabolic Panel; Future  -     CBC with Auto Differential; Future    Hypercholesteremia  -     Lipid Panel; Future    Borderline hypothyroidism  -     TSH; Future    Stable and continue on current medications.    Return BP, thyroid, cholesterol ....      HPI:  Anais Le is a 74 y.o.    Hypertension:  Home blood pressure monitorin-132/60-70's on Losartan-hctz 100/25 mg daily and amlodipine 5 mg twice a day.  She is adherent to a low sodium diet. Patient denies chest pain, shortness of breath, headache, lightheadedness, blurred vision, peripheral edema, palpitations, dry cough, and fatigue.  Antihypertensive medication side effects: no medication side effects noted.  Use of agents associated with hypertension: none.                   Hypothyroidism: Recent symptoms: none on Levothyroxine 50 mcg since 2023 but she would like to get off to see if have increase fatigue. She denies fatigue, weight gain, weight loss, cold intolerance, heat intolerance, hair loss, dry skin, constipation, diarrhea, edema, anxiety, tremor, palpitations, and dysphagia. Patient is  taking her medication consistently on an empty stomach.  Osteoporosis: she did have right forearm fracture in her 50's due to bad fall. Patient decline medication     Hyperlipidemia:  Patient is  following a low fat, low cholesterol diet.  She is  exercising regularly. OTC Supplements: none.  She decline medication.    Lab Results   Component Value Date    LABA1C 6.7 2023    LABA1C 6.3 2023    LABA1C 6.0 2022     Lab Results   Component Value Date

## 2024-08-06 LAB — NONINV COLON CA DNA+OCC BLD SCRN STL QL: NEGATIVE

## 2024-08-19 ENCOUNTER — TELEPHONE (OUTPATIENT)
Dept: SURGERY | Age: 74
End: 2024-08-19

## 2024-10-15 ENCOUNTER — OFFICE VISIT (OUTPATIENT)
Dept: INTERNAL MEDICINE CLINIC | Age: 74
End: 2024-10-15
Payer: MEDICARE

## 2024-10-15 VITALS
SYSTOLIC BLOOD PRESSURE: 132 MMHG | BODY MASS INDEX: 24.39 KG/M2 | HEIGHT: 59 IN | OXYGEN SATURATION: 98 % | WEIGHT: 121 LBS | DIASTOLIC BLOOD PRESSURE: 70 MMHG | HEART RATE: 78 BPM

## 2024-10-15 DIAGNOSIS — L21.9 SEBORRHEIC DERMATITIS: Primary | ICD-10-CM

## 2024-10-15 PROCEDURE — 3078F DIAST BP <80 MM HG: CPT | Performed by: INTERNAL MEDICINE

## 2024-10-15 PROCEDURE — 99213 OFFICE O/P EST LOW 20 MIN: CPT | Performed by: INTERNAL MEDICINE

## 2024-10-15 PROCEDURE — 1123F ACP DISCUSS/DSCN MKR DOCD: CPT | Performed by: INTERNAL MEDICINE

## 2024-10-15 PROCEDURE — 3075F SYST BP GE 130 - 139MM HG: CPT | Performed by: INTERNAL MEDICINE

## 2024-10-15 RX ORDER — KETOCONAZOLE 20 MG/ML
SHAMPOO TOPICAL
Qty: 120 ML | Refills: 1 | Status: SHIPPED | OUTPATIENT
Start: 2024-10-15

## 2024-10-15 NOTE — PROGRESS NOTES
Medium Adult   Pulse: 78   SpO2: 98%   Weight: 54.9 kg (121 lb)   Height: 1.505 m (4' 11.25\")     Wt Readings from Last 3 Encounters:   10/15/24 54.9 kg (121 lb)   06/25/24 54.9 kg (121 lb)   04/26/24 54.4 kg (120 lb)     BP Readings from Last 3 Encounters:   10/15/24 132/70   06/25/24 (!) 120/44   04/24/24 (!) 128/58     Body mass index is 24.23 kg/m².  Constitutional: Patient appears well-developed and well-nourished. No distress.   Head: Normocephalic and atraumatic.   Neck: Normal range of motion. Neck supple. No thyroidmegaly.   Cardiovascular: Normal rate, regular rhythm, normal heart sounds and intact distal pulses.   Pulmonary/Chest: Effort normal and breath sounds normal. No stridor. No respiratory distress. No wheezes and no rales.   Abdominal: Soft. Bowel sounds are normal. No distension and no mass. No tenderness. No rebound and no guarding.   Musculoskeletal: No edema and no tenderness.   Skin: No rash or erythema.

## 2024-10-15 NOTE — PATIENT INSTRUCTIONS
[facility]:  [phone]  [facility] (8 locations):  [phone]  [facility] [phone]   [facility] [phone]

## 2024-10-20 DIAGNOSIS — I10 PRIMARY HYPERTENSION: ICD-10-CM

## 2024-10-22 RX ORDER — LOSARTAN POTASSIUM AND HYDROCHLOROTHIAZIDE 25; 100 MG/1; MG/1
1 TABLET ORAL DAILY
Qty: 90 TABLET | Refills: 0 | Status: SHIPPED | OUTPATIENT
Start: 2024-10-22

## 2024-11-06 ENCOUNTER — PROCEDURE VISIT (OUTPATIENT)
Dept: SURGERY | Age: 74
End: 2024-11-06
Payer: MEDICARE

## 2024-11-06 VITALS — HEART RATE: 72 BPM | SYSTOLIC BLOOD PRESSURE: 130 MMHG | DIASTOLIC BLOOD PRESSURE: 80 MMHG

## 2024-11-06 DIAGNOSIS — C44.319 BASAL CELL CARCINOMA, FOREHEAD: Primary | ICD-10-CM

## 2024-11-06 PROCEDURE — 12052 INTMD RPR FACE/MM 2.6-5.0 CM: CPT | Performed by: DERMATOLOGY

## 2024-11-06 PROCEDURE — 17311 MOHS 1 STAGE H/N/HF/G: CPT | Performed by: DERMATOLOGY

## 2024-11-06 NOTE — PROGRESS NOTES
MOHS PROCEDURE NOTE    PHYSICIAN:  Laurie Gonzalez MD, Who operated in two distinct and integrated capacities as the surgeon removing the tissue and as the pathologist examining the tissue.    ASSISTANT: Zay Scott RN and Yuko Asif RN     REFERRING PROVIDER:  Margarita Echevarria MD     PREOPERATIVE DIAGNOSIS: Infiltrative Basal Cell Carcinoma     SPECIFIC MOHS INDICATIONS:  location and aggressive histologic growth pattern    AUC SCORIN/9    POSTOPERATIVE DIAGNOSIS: SAME    LOCATION: Mid forehead    OPERATIVE PROCEDURE:  MOHS MICROGRAPHIC SURGERY    RECONSTRUCTION OF DEFECT: Intermediate layered closure    PREOPERATIVE SIZE: 8x5 MM    DEFECT SIZE: 12x8 MM    LENGTH OF REPAIRED WOUND/SIZE OF FLAP/SIZE OF GRAFT:  39 MM    ANESTHESIA: 5 mL 1% lidocaine with epinephrine 1:100,000 buffered.     EBL:  MINIMAL    DURATION OF PROCEDURE:  1.5 HOURS    POSTOPERATIVE OBSERVATION: 0.5 HOUR    SPECIMENS:  SEE MOHS MAP    COMPLICATIONS:  NONE    DESCRIPTION OF PROCEDURE:  The patient was given a mirror, as appropriate, and the biopsy site was identified, marked with a surgical marking pen, and verified by the patient.   Options for treatment were discussed and the patient was informed that Mohs surgery was the selected treatment based on its lower recurrence rate, given the features listed above, as compared to other treatment modalities such as excision, radiation, or curettage, and agreed with this treatment plan.  Risks and benefits including bruising, swelling, bleeding, infection, nerve injury, recurrence, and scarring were discussed with the patient prior to the procedure and a written consent detailing these and other risks was reviewed with the patient and signed.    There was a time out for person and procedure verification.  The surgical site was prepped with an antiseptic solution.  Application of an antiseptic solution was repeated before each surgical stage.      Stage I:  The clinically-apparent tumor was

## 2024-11-06 NOTE — PROGRESS NOTES
PRE-PROCEDURE SCREENING    Pacemaker/ICD: No  Difficulty with numbing in the past: No  Local Anesthesia Reaction/passing out: No  Latex or adhesive allergy:  No  Any history of reaction to suture or skin glue:  no  Bleeding/Clotting Disorders: No  Anticoagulant Therapy: Yes, , 4x weekly  Joint prosthesis: No  Artificial Heart Valve: No  Stroke or Seizures: No  Organ Transplant or Lymphoma: No  Immunosuppression: No  Respiratory Problems: No

## 2024-11-07 ENCOUNTER — TELEPHONE (OUTPATIENT)
Dept: SURGERY | Age: 74
End: 2024-11-07

## 2024-11-07 NOTE — TELEPHONE ENCOUNTER
The patient was in the office on 11/6/2024 for mohs located on the mid forehead with ILC repair.  The patient tolerated the procedure well and left the office in good condition.    Pain level on post-operative day 1:  none    Any bleeding episode that required pressure to be held, bandage change or a call to the office or MD?  no     Any other issues?:  Feels itchy around where the bandage was-asked what she could used and I told her hydrocortisone but might resolve on it's own now that the bandage has been removed.    A post-operative telephone call was placed at 11/7/2024 in order to check on the patient's recovery process.  The patient reported doing well and had no complaints other than those listed above, if any.  All of the patient's questions were answered.

## 2024-11-15 ENCOUNTER — HOSPITAL ENCOUNTER (OUTPATIENT)
Age: 74
Discharge: HOME | End: 2024-11-15
Payer: MEDICARE

## 2024-11-15 DIAGNOSIS — E03.9 BORDERLINE HYPOTHYROIDISM: ICD-10-CM

## 2024-11-15 DIAGNOSIS — E78.00 HYPERCHOLESTEREMIA: ICD-10-CM

## 2024-11-15 DIAGNOSIS — I10 PRIMARY HYPERTENSION: ICD-10-CM

## 2024-11-15 LAB
ALBUMIN SERPL-MCNC: 4.4 G/DL (ref 3.4–5)
ALBUMIN/GLOB SERPL: 1.3 {RATIO} (ref 1.1–2.2)
ALP SERPL-CCNC: 93 U/L (ref 40–129)
ALT SERPL-CCNC: 16 U/L (ref 10–40)
ANION GAP SERPL CALCULATED.3IONS-SCNC: 10 MMOL/L (ref 3–16)
AST SERPL-CCNC: 23 U/L (ref 15–37)
BASOPHILS # BLD: 0.1 K/UL (ref 0–0.2)
BASOPHILS NFR BLD: 0.7 %
BILIRUB SERPL-MCNC: 0.3 MG/DL (ref 0–1)
BUN SERPL-MCNC: 27 MG/DL (ref 7–20)
CALCIUM SERPL-MCNC: 9.8 MG/DL (ref 8.3–10.6)
CHLORIDE SERPL-SCNC: 101 MMOL/L (ref 99–110)
CHOLEST SERPL-MCNC: 241 MG/DL (ref 0–199)
CO2 SERPL-SCNC: 27 MMOL/L (ref 21–32)
CREAT SERPL-MCNC: 1.1 MG/DL (ref 0.6–1.2)
DEPRECATED RDW RBC AUTO: 12.5 % (ref 12.4–15.4)
EOSINOPHIL # BLD: 0.1 K/UL (ref 0–0.6)
EOSINOPHIL NFR BLD: 1.9 %
GFR SERPLBLD CREATININE-BSD FMLA CKD-EPI: 52 ML/MIN/{1.73_M2}
GLUCOSE SERPL-MCNC: 137 MG/DL (ref 70–99)
HCT VFR BLD AUTO: 39.2 % (ref 36–48)
HDLC SERPL-MCNC: 46 MG/DL (ref 40–60)
HGB BLD-MCNC: 13.7 G/DL (ref 12–16)
LDLC SERPL CALC-MCNC: 176 MG/DL
LYMPHOCYTES # BLD: 1.8 K/UL (ref 1–5.1)
LYMPHOCYTES NFR BLD: 22.9 %
MCH RBC QN AUTO: 31.1 PG (ref 26–34)
MCHC RBC AUTO-ENTMCNC: 34.9 G/DL (ref 31–36)
MCV RBC AUTO: 89.2 FL (ref 80–100)
MONOCYTES # BLD: 0.7 K/UL (ref 0–1.3)
MONOCYTES NFR BLD: 9 %
NEUTROPHILS # BLD: 5.1 K/UL (ref 1.7–7.7)
NEUTROPHILS NFR BLD: 65.5 %
PLATELET # BLD AUTO: 332 K/UL (ref 135–450)
PMV BLD AUTO: 10.3 FL (ref 5–10.5)
POTASSIUM SERPL-SCNC: 4 MMOL/L (ref 3.5–5.1)
PROT SERPL-MCNC: 7.9 G/DL (ref 6.4–8.2)
RBC # BLD AUTO: 4.4 M/UL (ref 4–5.2)
SODIUM SERPL-SCNC: 138 MMOL/L (ref 136–145)
TRIGL SERPL-MCNC: 93 MG/DL (ref 0–150)
TSH SERPL DL<=0.005 MIU/L-ACNC: 11.3 UIU/ML (ref 0.27–4.2)
VLDLC SERPL CALC-MCNC: 19 MG/DL
WBC # BLD AUTO: 7.8 K/UL (ref 4–11)

## 2024-11-15 PROCEDURE — 84443 ASSAY THYROID STIM HORMONE: CPT

## 2024-11-15 PROCEDURE — 80053 COMPREHEN METABOLIC PANEL: CPT

## 2024-11-15 PROCEDURE — 80061 LIPID PANEL: CPT

## 2024-11-15 PROCEDURE — 85025 COMPLETE CBC W/AUTO DIFF WBC: CPT

## 2024-11-15 PROCEDURE — 36415 COLL VENOUS BLD VENIPUNCTURE: CPT

## 2024-11-19 ENCOUNTER — OFFICE VISIT (OUTPATIENT)
Dept: INTERNAL MEDICINE CLINIC | Age: 74
End: 2024-11-19
Payer: MEDICARE

## 2024-11-19 VITALS
HEIGHT: 59 IN | SYSTOLIC BLOOD PRESSURE: 132 MMHG | OXYGEN SATURATION: 98 % | WEIGHT: 119 LBS | BODY MASS INDEX: 23.99 KG/M2 | DIASTOLIC BLOOD PRESSURE: 60 MMHG | HEART RATE: 64 BPM

## 2024-11-19 DIAGNOSIS — M81.0 AGE-RELATED OSTEOPOROSIS WITHOUT CURRENT PATHOLOGICAL FRACTURE: ICD-10-CM

## 2024-11-19 DIAGNOSIS — I10 PRIMARY HYPERTENSION: ICD-10-CM

## 2024-11-19 DIAGNOSIS — E03.9 BORDERLINE HYPOTHYROIDISM: Primary | ICD-10-CM

## 2024-11-19 DIAGNOSIS — M40.293 OTHER KYPHOSIS OF CERVICOTHORACIC REGION: ICD-10-CM

## 2024-11-19 DIAGNOSIS — L21.9 SEBORRHEIC DERMATITIS: ICD-10-CM

## 2024-11-19 PROBLEM — M40.203 KYPHOSIS OF CERVICOTHORACIC REGION: Status: ACTIVE | Noted: 2024-11-19

## 2024-11-19 PROCEDURE — 3078F DIAST BP <80 MM HG: CPT | Performed by: INTERNAL MEDICINE

## 2024-11-19 PROCEDURE — 99214 OFFICE O/P EST MOD 30 MIN: CPT | Performed by: INTERNAL MEDICINE

## 2024-11-19 PROCEDURE — 3075F SYST BP GE 130 - 139MM HG: CPT | Performed by: INTERNAL MEDICINE

## 2024-11-19 PROCEDURE — 1160F RVW MEDS BY RX/DR IN RCRD: CPT | Performed by: INTERNAL MEDICINE

## 2024-11-19 PROCEDURE — 1159F MED LIST DOCD IN RCRD: CPT | Performed by: INTERNAL MEDICINE

## 2024-11-19 PROCEDURE — 1123F ACP DISCUSS/DSCN MKR DOCD: CPT | Performed by: INTERNAL MEDICINE

## 2024-11-19 RX ORDER — LEVOTHYROXINE SODIUM 25 UG/1
25 TABLET ORAL DAILY
Qty: 90 TABLET | Refills: 0 | Status: SHIPPED | OUTPATIENT
Start: 2024-11-19

## 2024-11-19 RX ORDER — IBANDRONATE SODIUM 150 MG/1
150 TABLET, FILM COATED ORAL
Qty: 3 TABLET | Refills: 3 | Status: SHIPPED | OUTPATIENT
Start: 2024-11-19

## 2024-11-19 NOTE — PROGRESS NOTES
Anais Le  YOB: 1950    Date of Service:  2024    Chief Complaint:      Chief Complaint   Patient presents with    Hypertension    Cholesterol Problem    Thyroid Problem       Assessment/Plan:  Anais was seen today for hypertension, cholesterol problem and thyroid problem.    Diagnoses and all orders for this visit:    Borderline hypothyroidism  Restart levothyroxine (SYNTHROID) 25 MCG tablet; Take 1 tablet by mouth daily    Age-related osteoporosis without current pathological fracture  Start ibandronate (BONIVA) 150 MG tablet; Take 1 tablet by mouth every 30 days Take one (1) tablet once per month in the morning with a full glass of water, on an empty stomach, and do not take anything else by mouth or lie down for the next 60 minutes.    Other kyphosis of cervicothoracic region  Start  ibandronate (BONIVA) 150 MG tablet; Take 1 tablet by mouth every 30 days Take one (1) tablet once per month in the morning with a full glass of water, on an empty stomach, and do not take anything else by mouth or lie down for the next 60 minutes.    Primary hypertension    Seborrheic dermatitis    Stable and continue on current medications.      Return AWV, Thyroid, Osteoporosis and sleep .      HPI:  Anais JACKSON Peak is a 74 y.o.    Osteoporosis with kyphosis: she did have right forearm fracture in her 50's due to bad fall. Patient will consider medication     Hypertension:  Home blood pressure monitorin-132/60-70's on Losartan-hctz 100/25 mg daily and amlodipine 10mg qd.  She is adherent to a low sodium diet. Patient denies chest pain, shortness of breath, headache, lightheadedness, blurred vision, peripheral edema, palpitations, dry cough, and fatigue.  Antihypertensive medication side effects: no medication side effects noted.  Use of agents associated with hypertension: none.                   Hypothyroidism: Recent symptoms: none off Levothyroxine 50 mcg except a little bloated. She

## 2024-12-17 ENCOUNTER — TELEPHONE (OUTPATIENT)
Dept: SURGERY | Age: 74
End: 2024-12-17

## 2024-12-17 NOTE — TELEPHONE ENCOUNTER
Called patient back and spoke with her about possible spitting suture. Pt states the pimple has gone away now and she does not see a stitch at this time. She will continue to massage with Aquaphor or bacitracin for a few days. She will call if any concerns arise.

## 2024-12-17 NOTE — TELEPHONE ENCOUNTER
Pt had mohs BCC FOCALLY INFILTRATING, MID FOREHEAD on 11/6/24. She states if feels like a stitch is sticking out and also looks like a pimple at surgery site.She is asking to be seen.

## 2025-01-18 SDOH — ECONOMIC STABILITY: INCOME INSECURITY: IN THE LAST 12 MONTHS, WAS THERE A TIME WHEN YOU WERE NOT ABLE TO PAY THE MORTGAGE OR RENT ON TIME?: NO

## 2025-01-18 SDOH — ECONOMIC STABILITY: FOOD INSECURITY: WITHIN THE PAST 12 MONTHS, YOU WORRIED THAT YOUR FOOD WOULD RUN OUT BEFORE YOU GOT MONEY TO BUY MORE.: NEVER TRUE

## 2025-01-18 SDOH — ECONOMIC STABILITY: FOOD INSECURITY: WITHIN THE PAST 12 MONTHS, THE FOOD YOU BOUGHT JUST DIDN'T LAST AND YOU DIDN'T HAVE MONEY TO GET MORE.: NEVER TRUE

## 2025-01-18 SDOH — HEALTH STABILITY: PHYSICAL HEALTH: ON AVERAGE, HOW MANY DAYS PER WEEK DO YOU ENGAGE IN MODERATE TO STRENUOUS EXERCISE (LIKE A BRISK WALK)?: 6 DAYS

## 2025-01-18 SDOH — HEALTH STABILITY: PHYSICAL HEALTH: ON AVERAGE, HOW MANY MINUTES DO YOU ENGAGE IN EXERCISE AT THIS LEVEL?: 40 MIN

## 2025-01-18 ASSESSMENT — PATIENT HEALTH QUESTIONNAIRE - PHQ9
SUM OF ALL RESPONSES TO PHQ QUESTIONS 1-9: 0
1. LITTLE INTEREST OR PLEASURE IN DOING THINGS: NOT AT ALL
SUM OF ALL RESPONSES TO PHQ QUESTIONS 1-9: 0
SUM OF ALL RESPONSES TO PHQ9 QUESTIONS 1 & 2: 0
2. FEELING DOWN, DEPRESSED OR HOPELESS: NOT AT ALL
SUM OF ALL RESPONSES TO PHQ QUESTIONS 1-9: 0
SUM OF ALL RESPONSES TO PHQ QUESTIONS 1-9: 0

## 2025-01-18 ASSESSMENT — LIFESTYLE VARIABLES
HOW OFTEN DO YOU HAVE SIX OR MORE DRINKS ON ONE OCCASION: 1
HOW MANY STANDARD DRINKS CONTAINING ALCOHOL DO YOU HAVE ON A TYPICAL DAY: 0
HOW MANY STANDARD DRINKS CONTAINING ALCOHOL DO YOU HAVE ON A TYPICAL DAY: PATIENT DOES NOT DRINK
HOW OFTEN DO YOU HAVE A DRINK CONTAINING ALCOHOL: NEVER
HOW OFTEN DO YOU HAVE A DRINK CONTAINING ALCOHOL: 1

## 2025-01-21 ENCOUNTER — OFFICE VISIT (OUTPATIENT)
Dept: INTERNAL MEDICINE CLINIC | Age: 75
End: 2025-01-21

## 2025-01-21 VITALS
SYSTOLIC BLOOD PRESSURE: 152 MMHG | DIASTOLIC BLOOD PRESSURE: 72 MMHG | WEIGHT: 117 LBS | BODY MASS INDEX: 24.56 KG/M2 | HEIGHT: 58 IN | OXYGEN SATURATION: 95 % | HEART RATE: 67 BPM

## 2025-01-21 DIAGNOSIS — I10 PRIMARY HYPERTENSION: ICD-10-CM

## 2025-01-21 DIAGNOSIS — Z00.00 MEDICARE ANNUAL WELLNESS VISIT, SUBSEQUENT: Primary | ICD-10-CM

## 2025-01-21 DIAGNOSIS — M40.293 OTHER KYPHOSIS OF CERVICOTHORACIC REGION: ICD-10-CM

## 2025-01-21 DIAGNOSIS — E03.9 BORDERLINE HYPOTHYROIDISM: ICD-10-CM

## 2025-01-21 DIAGNOSIS — M81.0 AGE-RELATED OSTEOPOROSIS WITHOUT CURRENT PATHOLOGICAL FRACTURE: ICD-10-CM

## 2025-01-21 RX ORDER — LOSARTAN POTASSIUM AND HYDROCHLOROTHIAZIDE 25; 100 MG/1; MG/1
1 TABLET ORAL DAILY
Qty: 90 TABLET | Refills: 1 | Status: SHIPPED | OUTPATIENT
Start: 2025-01-21

## 2025-01-21 RX ORDER — LOSARTAN POTASSIUM AND HYDROCHLOROTHIAZIDE 25; 100 MG/1; MG/1
1 TABLET ORAL DAILY
Qty: 90 TABLET | Refills: 0 | Status: SHIPPED | OUTPATIENT
Start: 2025-01-21 | End: 2025-01-21 | Stop reason: SDUPTHER

## 2025-01-21 RX ORDER — AMLODIPINE BESYLATE 10 MG/1
10 TABLET ORAL DAILY
Qty: 90 TABLET | Refills: 3 | Status: SHIPPED | OUTPATIENT
Start: 2025-01-21

## 2025-01-21 NOTE — PROGRESS NOTES
Medicare Annual Wellness Visit    Anais Le is here for Annual Exam, Hypertension, Cholesterol Problem, Thyroid Problem, and Medication Refill (Amlodipine 10mg)    Assessment & Plan   Medicare annual wellness visit, subsequent     No follow-ups on file.     Subjective       Patient's complete Health Risk Assessment and screening values have been reviewed and are found in Flowsheets. The following problems were reviewed today and where indicated follow up appointments were made and/or referrals ordered.    Positive Risk Factor Screenings with Interventions:                      Advanced Directives:  Do you have a Living Will?: (!) No    Intervention:  Full Code                 Objective   Vitals:    01/21/25 1040   BP: (!) 152/72   Site: Right Upper Arm   Position: Sitting   Cuff Size: Medium Adult   Pulse: 67   SpO2: 95%   Weight: 53.1 kg (117 lb)   Height: 1.48 m (4' 10.27\")      Body mass index is 24.23 kg/m².                    Allergies   Allergen Reactions    Amoxicillin     Lisinopril Cough     Prior to Visit Medications    Medication Sig Taking? Authorizing Provider   levothyroxine (SYNTHROID) 25 MCG tablet Take 1 tablet by mouth daily Yes Makenna Green MD   ibandronate (BONIVA) 150 MG tablet Take 1 tablet by mouth every 30 days Take one (1) tablet once per month in the morning with a full glass of water, on an empty stomach, and do not take anything else by mouth or lie down for the next 60 minutes. Yes Makenna Green MD   losartan-hydroCHLOROthiazide (HYZAAR) 100-25 MG per tablet TAKE 1 TABLET BY MOUTH DAILY Yes Makenna Green MD   amLODIPine (NORVASC) 10 MG tablet Take 1 tablet by mouth daily Yes Makenna Green MD   Aspirin 325 MG CAPS Take by mouth Prn Yes ProviderGlenn MD   fluticasone (FLONASE) 50 MCG/ACT nasal spray 2 sprays by Each Nostril route daily Yes Kristin Petersen MD       [facility] (Including outside providers/suppliers regularly involved in providing care):   Patient Care

## 2025-01-21 NOTE — PROGRESS NOTES
Anais Le  YOB: 1950    Date of Service:  2025    Chief Complaint:      Chief Complaint   Patient presents with    Hypertension    Cholesterol Problem    Thyroid Problem    Medication Refill     Amlodipine 10mg    Medicare AWV       Assessment/Plan:  Anais was seen today for hypertension, cholesterol problem, thyroid problem, medication refill and medicare awv.    Diagnoses and all orders for this visit:    Medicare annual wellness visit, subsequent    Primary hypertension  Restart amLODIPine (NORVASC) 10 MG tablet; Take 1 tablet by mouth daily  -     Discontinue: losartan-hydroCHLOROthiazide (HYZAAR) 100-25 MG per tablet; Take 1 tablet by mouth daily  -     losartan-hydroCHLOROthiazide (HYZAAR) 100-25 MG per tablet; Take 1 tablet by mouth daily    Borderline hypothyroidism  -     TSH  -     T4, Free  Patient wants to discontinue since asymptomatic on/off medication    Other kyphosis of cervicothoracic region    Age-related osteoporosis without current pathological fracture    Stable and continue on current treatment      Return BP, Osteoporosis 7/15.      HPI:  Anais Le is a 74 y.o.    Osteoporosis with kyphosis: she did have right forearm fracture in her 50's due to bad fall. Patient will consider medication      Hypertension:  Home blood pressure monitorin-120/60-70's on Losartan-hctz 100/25 mg daily and ran out of amlodipine 10mg daily for 3 days.  She is adherent to a low sodium diet. Patient denies chest pain, shortness of breath, headache, lightheadedness, blurred vision, peripheral edema, palpitations, dry cough, and fatigue.  Antihypertensive medication side effects: no medication side effects noted.  Use of agents associated with hypertension: none.                   Hypothyroidism: Recent symptoms: none on Levothyroxine 25 mcg daily and no changes on/off medication.   She denies fatigue, weight gain, weight loss, cold intolerance, heat intolerance, hair loss, dry

## 2025-01-22 LAB
T4 FREE SERPL-MCNC: 1.2 NG/DL (ref 0.9–1.8)
TSH SERPL DL<=0.005 MIU/L-ACNC: 3.18 UIU/ML (ref 0.27–4.2)

## 2025-03-03 DIAGNOSIS — M81.0 AGE-RELATED OSTEOPOROSIS WITHOUT CURRENT PATHOLOGICAL FRACTURE: ICD-10-CM

## 2025-03-03 DIAGNOSIS — M40.293 OTHER KYPHOSIS OF CERVICOTHORACIC REGION: ICD-10-CM
